# Patient Record
Sex: FEMALE | Race: WHITE | NOT HISPANIC OR LATINO | Employment: FULL TIME | URBAN - METROPOLITAN AREA
[De-identification: names, ages, dates, MRNs, and addresses within clinical notes are randomized per-mention and may not be internally consistent; named-entity substitution may affect disease eponyms.]

---

## 2017-07-20 ENCOUNTER — APPOINTMENT (OUTPATIENT)
Dept: RADIOLOGY | Facility: MEDICAL CENTER | Age: 52
End: 2017-07-20
Attending: OBSTETRICS & GYNECOLOGY
Payer: COMMERCIAL

## 2017-07-31 ENCOUNTER — HOSPITAL ENCOUNTER (OUTPATIENT)
Dept: HOSPITAL 8 - CFH | Age: 52
Discharge: HOME | End: 2017-07-31
Attending: OTOLARYNGOLOGY
Payer: COMMERCIAL

## 2017-07-31 DIAGNOSIS — R13.14: Primary | ICD-10-CM

## 2017-07-31 DIAGNOSIS — Z98.1: ICD-10-CM

## 2017-07-31 PROCEDURE — 70491 CT SOFT TISSUE NECK W/DYE: CPT

## 2020-12-17 ENCOUNTER — OFFICE VISIT (OUTPATIENT)
Dept: URGENT CARE | Facility: PHYSICIAN GROUP | Age: 55
End: 2020-12-17
Payer: OTHER MISCELLANEOUS

## 2020-12-17 ENCOUNTER — HOSPITAL ENCOUNTER (OUTPATIENT)
Facility: MEDICAL CENTER | Age: 55
End: 2020-12-17
Attending: NURSE PRACTITIONER
Payer: COMMERCIAL

## 2020-12-17 VITALS
BODY MASS INDEX: 20.38 KG/M2 | WEIGHT: 115 LBS | TEMPERATURE: 98.1 F | SYSTOLIC BLOOD PRESSURE: 100 MMHG | HEIGHT: 63 IN | OXYGEN SATURATION: 98 % | RESPIRATION RATE: 18 BRPM | DIASTOLIC BLOOD PRESSURE: 62 MMHG | HEART RATE: 82 BPM

## 2020-12-17 DIAGNOSIS — R50.9 FEVER, UNSPECIFIED FEVER CAUSE: ICD-10-CM

## 2020-12-17 DIAGNOSIS — R05.9 COUGH: ICD-10-CM

## 2020-12-17 DIAGNOSIS — Z20.822 EXPOSURE TO COVID-19 VIRUS: ICD-10-CM

## 2020-12-17 LAB — COVID ORDER STATUS COVID19: NORMAL

## 2020-12-17 PROCEDURE — 99204 OFFICE O/P NEW MOD 45 MIN: CPT | Performed by: NURSE PRACTITIONER

## 2020-12-17 PROCEDURE — U0003 INFECTIOUS AGENT DETECTION BY NUCLEIC ACID (DNA OR RNA); SEVERE ACUTE RESPIRATORY SYNDROME CORONAVIRUS 2 (SARS-COV-2) (CORONAVIRUS DISEASE [COVID-19]), AMPLIFIED PROBE TECHNIQUE, MAKING USE OF HIGH THROUGHPUT TECHNOLOGIES AS DESCRIBED BY CMS-2020-01-R: HCPCS

## 2020-12-17 ASSESSMENT — ENCOUNTER SYMPTOMS
SPUTUM PRODUCTION: 0
ABDOMINAL PAIN: 0
SORE THROAT: 0
SHORTNESS OF BREATH: 0
ORTHOPNEA: 0
MYALGIAS: 1
PALPITATIONS: 0
CONSTIPATION: 0
DIARRHEA: 0
DIZZINESS: 0
NAUSEA: 0
SINUS PAIN: 0
COUGH: 1
CHILLS: 0
FEVER: 1
WHEEZING: 0
VOMITING: 0
HEADACHES: 1
WEAKNESS: 0

## 2020-12-17 NOTE — PROGRESS NOTES
Subjective:      Maritza Shaffer is a 55 y.o. female who presents with Cough (cough, fever 100.2 yesterday, chest tightness, body aches. daughter is positive for covid. )            HPI  C/o cough, fever yesterday, body aches, HA x 1 week. Daughter tested positive x 3 days ago for COVID. Taking OTC Ibuprofen. States she is off work until 12/28/20 due to direct exposure to daughter.     PMH:  has a past medical history of Kidney stones and Staphylococcus infection in conditions classified elsewhere and of unspecified site. She also has no past medical history of Breast cancer (HCC).  MEDS:   Current Outpatient Medications:   •  ibuprofen (MOTRIN) 200 MG TABS, Take 200 mg by mouth every 6 hours as needed., Disp: , Rfl:   •  hydrocodone-acetaminophen (NORCO) 5-325 MG TABS per tablet, Take 1-2 Tabs by mouth at bedtime as needed. Do not take at work/driving/operating machinery (Patient not taking: Reported on 12/17/2020), Disp: 30 Tab, Rfl: 0  •  lidocaine (LIDODERM) 5 % PTCH, Apply 1 Patch to skin as directed every 24 hours. (Patient not taking: Reported on 12/17/2020), Disp: 30 Patch, Rfl: 0  •  methylPREDNISolone (MEDROL, ELIZABETH,) 4 MG tablet, As directed (Patient not taking: Reported on 12/17/2020), Disp: 1 Kit, Rfl: 0  •  hydrocodone-acetaminophen (NORCO) 5-325 MG TABS per tablet, Take 1-2 Tabs by mouth at bedtime as needed. (Patient not taking: Reported on 12/17/2020), Disp: 30 Tab, Rfl: 0  •  lidocaine (LIDODERM) 5 % PTCH, Apply 1 Patch to skin as directed every 24 hours. (Patient not taking: Reported on 12/17/2020), Disp: 30 Patch, Rfl: 0  •  cyclobenzaprine (FLEXERIL) 5 MG tablet, Take 1-2 Tabs by mouth at bedtime as needed. (Patient not taking: Reported on 12/17/2020), Disp: 10 Tab, Rfl: 0  •  naproxen (NAPROSYN) 500 MG TABS, Take 1 Tab by mouth 2 times a day, with meals. (Patient not taking: Reported on 12/17/2020), Disp: 60 Tab, Rfl: 3  •  hydrocodone-acetaminophen (NORCO) 7.5-325 MG per tablet, Take 1-2 Tabs  by mouth every 6 hours as needed. (Patient not taking: Reported on 12/17/2020), Disp: 20 Tab, Rfl: 0  •  GuaiFENesin (MUCINEX PO), Take  by mouth., Disp: , Rfl:   •  hydrocod polst-chlorphen polst (TUSSIONEX PENNKINETIC ER) 10-8 MG/5ML LQCR, Take 5 mL by mouth every 12 hours as needed for Cough or Rhinitis. No driving or operation of machinery after taking this medication. Do not exceed 10 mL in a 24-hour period. (Patient not taking: Reported on 12/17/2020), Disp: 75 mL, Rfl: 0  •  VENTOLIN  (90 BASE) MCG/ACT AERS, Inhale 2 Puffs by mouth every 6 hours as needed for Shortness of Breath. Please include metered dose inhaler chamber/spacer device and illustrate useage (Patient not taking: Reported on 12/17/2020), Disp: 1 Inhaler, Rfl: 0  •  Walalqolc-DYE-IS-APAP (TYLENOL COLD HEAD CONGESTION PO), Take  by mouth., Disp: , Rfl:   •  guaifenesin-codeine (ROBITUSSIN AC) SOLN, Take 5 mL by mouth every four hours as needed for Cough. (Patient not taking: Reported on 12/17/2020), Disp: 120 mL, Rfl: 0  •  albuterol (VENTOLIN OR PROVENTIL) 108 (90 BASE) MCG/ACT AERS, Inhale 2 Puffs by mouth every 6 hours as needed for Shortness of Breath. (Patient not taking: Reported on 12/17/2020), Disp: 1 Inhaler, Rfl: 0  •  aspirin (ASA) 81 MG CHEW, Take 81 mg by mouth every day.  , Disp: , Rfl:   ALLERGIES: No Known Allergies  SURGHX:   Past Surgical History:   Procedure Laterality Date   • CERVICAL DISK AND FUSION ANTERIOR  10/31/08    Performed by MANOHAR METCALF at [facility] ORS   • OTHER      tubal   • OTHER ORTHOPEDIC SURGERY      knee     SOCHX:  reports that she has been smoking. She does not have any smokeless tobacco history on file. She reports current alcohol use. She reports that she does not use drugs.  FH: Family history was reviewed, no pertinent findings to report    Review of Systems   Constitutional: Positive for fever and malaise/fatigue. Negative for chills.   HENT: Negative for congestion, ear pain,  "sinus pain and sore throat.    Respiratory: Positive for cough. Negative for sputum production, shortness of breath and wheezing.    Cardiovascular: Negative for chest pain, palpitations and orthopnea.   Gastrointestinal: Negative for abdominal pain, constipation, diarrhea, nausea and vomiting.   Musculoskeletal: Positive for myalgias.   Skin: Negative for itching and rash.   Neurological: Positive for headaches. Negative for dizziness and weakness.   All other systems reviewed and are negative.         Objective:     /62   Pulse 82   Temp 36.7 °C (98.1 °F) (Temporal)   Resp 18   Ht 1.6 m (5' 3\")   Wt 52.2 kg (115 lb)   SpO2 98%   BMI 20.37 kg/m²      Physical Exam  Vitals signs reviewed.   Constitutional:       General: She is awake. She is not in acute distress.     Appearance: Normal appearance. She is well-developed. She is not ill-appearing, toxic-appearing or diaphoretic.   HENT:      Head: Normocephalic.   Eyes:      Conjunctiva/sclera: Conjunctivae normal.      Pupils: Pupils are equal, round, and reactive to light.   Neck:      Musculoskeletal: Normal range of motion and neck supple.   Cardiovascular:      Rate and Rhythm: Normal rate.   Pulmonary:      Effort: Pulmonary effort is normal. No tachypnea, accessory muscle usage or respiratory distress.      Breath sounds: Normal breath sounds and air entry.      Comments: No cough heard on exam.  Musculoskeletal: Normal range of motion.   Skin:     General: Skin is warm and dry.   Neurological:      Mental Status: She is alert and oriented to person, place, and time.      GCS: GCS eye subscore is 4. GCS verbal subscore is 5. GCS motor subscore is 6.   Psychiatric:         Attention and Perception: Attention and perception normal.         Mood and Affect: Mood and affect normal.         Speech: Speech normal.         Behavior: Behavior normal. Behavior is cooperative.               Provider wore N95 and/or surgical mask, eye goggles and/or gloves " with hand sanitization during exam.      Assessment/Plan:        1. Exposure to COVID-19 virus  - COVID/SARS COV-2 PCR; Future    2. Cough    - COVID/SARS COV-2 PCR; Future    3. Fever, unspecified fever cause    - COVID/SARS COV-2 PCR; Future    Increase water intake  May use Tylenol prn for fever or body aches  Get rest  Salt water gargle prn  Flonase, saline nasal spray prn for nasal congestion  May use OTC cough suppressant medications like plain Robitussin/Delsym prn  OTC Immodium prn for any diarrhea with diet modification to clear/bland diet  Monitor for fevers, worse cough, shortness of breath, CP, chest tightness, sinus problems- need re-evaluation  COVID tip sheet given     MyChart release for result, may take up to 72 hrs for result, patient notified

## 2020-12-18 LAB
SARS-COV-2 RNA RESP QL NAA+PROBE: NOTDETECTED
SPECIMEN SOURCE: NORMAL

## 2021-01-05 ENCOUNTER — HOSPITAL ENCOUNTER (OUTPATIENT)
Dept: RADIOLOGY | Facility: MEDICAL CENTER | Age: 56
End: 2021-01-05
Attending: STUDENT IN AN ORGANIZED HEALTH CARE EDUCATION/TRAINING PROGRAM
Payer: COMMERCIAL

## 2021-01-05 DIAGNOSIS — M54.2 NECK PAIN: ICD-10-CM

## 2021-01-05 PROCEDURE — 72050 X-RAY EXAM NECK SPINE 4/5VWS: CPT

## 2021-09-25 ENCOUNTER — HOSPITAL ENCOUNTER (EMERGENCY)
Facility: MEDICAL CENTER | Age: 56
End: 2021-09-25
Attending: EMERGENCY MEDICINE
Payer: COMMERCIAL

## 2021-09-25 ENCOUNTER — APPOINTMENT (OUTPATIENT)
Dept: RADIOLOGY | Facility: MEDICAL CENTER | Age: 56
End: 2021-09-25
Attending: EMERGENCY MEDICINE
Payer: COMMERCIAL

## 2021-09-25 VITALS
OXYGEN SATURATION: 96 % | BODY MASS INDEX: 19.77 KG/M2 | TEMPERATURE: 97.9 F | WEIGHT: 111.55 LBS | HEART RATE: 69 BPM | DIASTOLIC BLOOD PRESSURE: 78 MMHG | SYSTOLIC BLOOD PRESSURE: 101 MMHG | RESPIRATION RATE: 16 BRPM | HEIGHT: 63 IN

## 2021-09-25 DIAGNOSIS — R11.0 NAUSEA: ICD-10-CM

## 2021-09-25 DIAGNOSIS — S00.03XA HEMATOMA OF SCALP, INITIAL ENCOUNTER: ICD-10-CM
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
## 2021-09-25 DIAGNOSIS — S06.0X1A CONCUSSION WITH LOSS OF CONSCIOUSNESS OF 30 MINUTES OR LESS, INITIAL ENCOUNTER: ICD-10-CM
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
## 2021-09-25 LAB
ALBUMIN SERPL BCP-MCNC: 3.8 G/DL (ref 3.2–4.9)
ALBUMIN/GLOB SERPL: 1.5 G/DL
ALP SERPL-CCNC: 64 U/L (ref 30–99)
ALT SERPL-CCNC: 13 U/L (ref 2–50)
ANION GAP SERPL CALC-SCNC: 10 MMOL/L (ref 7–16)
APTT PPP: 28.6 SEC (ref 24.7–36)
AST SERPL-CCNC: 20 U/L (ref 12–45)
BASOPHILS # BLD AUTO: 0.6 % (ref 0–1.8)
BASOPHILS # BLD: 0.06 K/UL (ref 0–0.12)
BILIRUB SERPL-MCNC: 0.4 MG/DL (ref 0.1–1.5)
BUN SERPL-MCNC: 13 MG/DL (ref 8–22)
CALCIUM SERPL-MCNC: 9.2 MG/DL (ref 8.4–10.2)
CHLORIDE SERPL-SCNC: 108 MMOL/L (ref 96–112)
CO2 SERPL-SCNC: 20 MMOL/L (ref 20–33)
CREAT SERPL-MCNC: 0.63 MG/DL (ref 0.5–1.4)
EOSINOPHIL # BLD AUTO: 0.12 K/UL (ref 0–0.51)
EOSINOPHIL NFR BLD: 1.2 % (ref 0–6.9)
ERYTHROCYTE [DISTWIDTH] IN BLOOD BY AUTOMATED COUNT: 46.2 FL (ref 35.9–50)
ETHANOL BLD-MCNC: <10.1 MG/DL (ref 0–10)
GLOBULIN SER CALC-MCNC: 2.5 G/DL (ref 1.9–3.5)
GLUCOSE SERPL-MCNC: 116 MG/DL (ref 65–99)
HCG SERPL QL: NEGATIVE
HCT VFR BLD AUTO: 43 % (ref 37–47)
HGB BLD-MCNC: 14.5 G/DL (ref 12–16)
IMM GRANULOCYTES # BLD AUTO: 0.04 K/UL (ref 0–0.11)
IMM GRANULOCYTES NFR BLD AUTO: 0.4 % (ref 0–0.9)
INR PPP: 1.01 (ref 0.87–1.13)
LYMPHOCYTES # BLD AUTO: 1.18 K/UL (ref 1–4.8)
LYMPHOCYTES NFR BLD: 11.3 % (ref 22–41)
MCH RBC QN AUTO: 31.3 PG (ref 27–33)
MCHC RBC AUTO-ENTMCNC: 33.7 G/DL (ref 33.6–35)
MCV RBC AUTO: 92.7 FL (ref 81.4–97.8)
MONOCYTES # BLD AUTO: 0.48 K/UL (ref 0–0.85)
MONOCYTES NFR BLD AUTO: 4.6 % (ref 0–13.4)
NEUTROPHILS # BLD AUTO: 8.55 K/UL (ref 2–7.15)
NEUTROPHILS NFR BLD: 81.9 % (ref 44–72)
NRBC # BLD AUTO: 0 K/UL
NRBC BLD-RTO: 0 /100 WBC
PLATELET # BLD AUTO: 281 K/UL (ref 164–446)
PMV BLD AUTO: 9.7 FL (ref 9–12.9)
POTASSIUM SERPL-SCNC: 4.4 MMOL/L (ref 3.6–5.5)
PROT SERPL-MCNC: 6.3 G/DL (ref 6–8.2)
PROTHROMBIN TIME: 12.5 SEC (ref 12–14.6)
RBC # BLD AUTO: 4.64 M/UL (ref 4.2–5.4)
SODIUM SERPL-SCNC: 138 MMOL/L (ref 135–145)
WBC # BLD AUTO: 10.4 K/UL (ref 4.8–10.8)

## 2021-09-25 PROCEDURE — 700111 HCHG RX REV CODE 636 W/ 250 OVERRIDE (IP): Performed by: EMERGENCY MEDICINE

## 2021-09-25 PROCEDURE — 96375 TX/PRO/DX INJ NEW DRUG ADDON: CPT

## 2021-09-25 PROCEDURE — 99285 EMERGENCY DEPT VISIT HI MDM: CPT

## 2021-09-25 PROCEDURE — 700102 HCHG RX REV CODE 250 W/ 637 OVERRIDE(OP): Performed by: EMERGENCY MEDICINE

## 2021-09-25 PROCEDURE — 72125 CT NECK SPINE W/O DYE: CPT

## 2021-09-25 PROCEDURE — 82077 ASSAY SPEC XCP UR&BREATH IA: CPT

## 2021-09-25 PROCEDURE — A9270 NON-COVERED ITEM OR SERVICE: HCPCS | Performed by: EMERGENCY MEDICINE

## 2021-09-25 PROCEDURE — 80053 COMPREHEN METABOLIC PANEL: CPT

## 2021-09-25 PROCEDURE — 84703 CHORIONIC GONADOTROPIN ASSAY: CPT

## 2021-09-25 PROCEDURE — 96374 THER/PROPH/DIAG INJ IV PUSH: CPT

## 2021-09-25 PROCEDURE — 85025 COMPLETE CBC W/AUTO DIFF WBC: CPT

## 2021-09-25 PROCEDURE — 70450 CT HEAD/BRAIN W/O DYE: CPT

## 2021-09-25 PROCEDURE — 85730 THROMBOPLASTIN TIME PARTIAL: CPT

## 2021-09-25 PROCEDURE — 85610 PROTHROMBIN TIME: CPT

## 2021-09-25 PROCEDURE — 700105 HCHG RX REV CODE 258: Performed by: EMERGENCY MEDICINE

## 2021-09-25 RX ORDER — DIPHENHYDRAMINE HYDROCHLORIDE 50 MG/ML
25 INJECTION INTRAMUSCULAR; INTRAVENOUS ONCE
Status: COMPLETED | OUTPATIENT
Start: 2021-09-25 | End: 2021-09-25

## 2021-09-25 RX ORDER — ACETAMINOPHEN 500 MG
1000 TABLET ORAL ONCE
Status: COMPLETED | OUTPATIENT
Start: 2021-09-25 | End: 2021-09-25

## 2021-09-25 RX ORDER — KETOROLAC TROMETHAMINE 30 MG/ML
15 INJECTION, SOLUTION INTRAMUSCULAR; INTRAVENOUS ONCE
Status: COMPLETED | OUTPATIENT
Start: 2021-09-25 | End: 2021-09-25

## 2021-09-25 RX ORDER — METHOCARBAMOL 750 MG/1
750 TABLET, FILM COATED ORAL 3 TIMES DAILY
Qty: 30 TABLET | Refills: 0 | Status: SHIPPED | OUTPATIENT
Start: 2021-09-25 | End: 2021-10-05

## 2021-09-25 RX ORDER — ONDANSETRON 2 MG/ML
4 INJECTION INTRAMUSCULAR; INTRAVENOUS ONCE
Status: COMPLETED | OUTPATIENT
Start: 2021-09-25 | End: 2021-09-25

## 2021-09-25 RX ORDER — PROCHLORPERAZINE MALEATE 10 MG
10 TABLET ORAL ONCE
Status: COMPLETED | OUTPATIENT
Start: 2021-09-25 | End: 2021-09-25

## 2021-09-25 RX ORDER — SODIUM CHLORIDE 9 MG/ML
1000 INJECTION, SOLUTION INTRAVENOUS ONCE
Status: COMPLETED | OUTPATIENT
Start: 2021-09-25 | End: 2021-09-25

## 2021-09-25 RX ORDER — ONDANSETRON 4 MG/1
4 TABLET, ORALLY DISINTEGRATING ORAL EVERY 6 HOURS PRN
Qty: 10 TABLET | Refills: 0 | Status: SHIPPED | OUTPATIENT
Start: 2021-09-25 | End: 2021-10-01 | Stop reason: SDUPTHER

## 2021-09-25 RX ORDER — ACETAMINOPHEN 325 MG/1
650 TABLET ORAL EVERY 6 HOURS PRN
Qty: 30 TABLET | Refills: 0 | Status: SHIPPED | OUTPATIENT
Start: 2021-09-25 | End: 2021-10-22

## 2021-09-25 RX ADMIN — ACETAMINOPHEN 1000 MG: 500 TABLET, FILM COATED ORAL at 03:33

## 2021-09-25 RX ADMIN — FENTANYL CITRATE 50 MCG: 50 INJECTION, SOLUTION INTRAMUSCULAR; INTRAVENOUS at 03:05

## 2021-09-25 RX ADMIN — KETOROLAC TROMETHAMINE 15 MG: 30 INJECTION, SOLUTION INTRAMUSCULAR at 03:34

## 2021-09-25 RX ADMIN — SODIUM CHLORIDE 1000 ML: 9 INJECTION, SOLUTION INTRAVENOUS at 02:03

## 2021-09-25 RX ADMIN — PROCHLORPERAZINE MALEATE 10 MG: 10 TABLET ORAL at 03:33

## 2021-09-25 RX ADMIN — DIPHENHYDRAMINE HYDROCHLORIDE 25 MG: 50 INJECTION INTRAMUSCULAR; INTRAVENOUS at 03:34

## 2021-09-25 RX ADMIN — ONDANSETRON 4 MG: 2 INJECTION INTRAMUSCULAR; INTRAVENOUS at 02:03

## 2021-09-25 ASSESSMENT — PAIN DESCRIPTION - PAIN TYPE: TYPE: ACUTE PAIN

## 2021-09-25 NOTE — ED PROVIDER NOTES
ED Provider Note    CHIEF COMPLAINT  Fall, HA, dizziness    HPI  Patient is a 55-year-old female with no reported past medical history beyond some prior neck pain and prior surgery who presents emergency room for evaluation of headache and neck pain.  She states that she was at home earlier tonight, got up to go the bathroom and believes that she fell, she was very dazed and woke up in her bed and was having ongoing headache, pain in the back left portion of her head, some associated neck pain and stiffness and dizziness.  She called her daughter who came to the bedside and said that she was having substantial amounts of nausea, headaches and they brought her here for further evaluation.  Pain is severe, focused in the left posterior aspect of the head with mild to moderate pain in the upper portion of the neck, she has no numbness or tingling in her upper extremities, no chest pain or palpitations and has no recollection of any exertional chest discomfort or dizziness within the last week.  While lying in the bed she is not having any dizziness or double vision but has some of the symptoms exacerbated by rapid head movements.    PPE Note: I personally donned full PPE for all patient encounters during this visit, including being clean-shaven with an N95 respirator mask, gloves, and goggles.    REVIEW OF SYSTEMS  Constitutional: No recent illness.  Skin: posterior head pain  Eyes: No change in vision.  HENT: + scalp hematoma. No change in hearing. No epistaxis. No loose teeth.  Resp: No shortness of breath or difficulty breathing.  Cardiac: No chest pain.  GI: No abdominal pain. No vomiting.  : no dysuria  Musc: No swollen joints or extremity pain.  Neuro: + HA. + LOC. No paresthesias.  Endocrine: No history of diabetes.  Heme: No known bleeding disorder or anemia.  Psych: No depression.    PAST MEDICAL HISTORY   has a past medical history of Kidney stones and Staphylococcus infection in conditions classified  "elsewhere and of unspecified site.    SOCIAL HISTORY  Social History     Tobacco Use   • Smoking status: Current Every Day Smoker   • Smokeless tobacco: Never Used   • Tobacco comment: 1 pack per day   Substance and Sexual Activity   • Alcohol use: Yes     Comment: occ   • Drug use: No   • Sexual activity: Not on file     SURGICAL HISTORY   has a past surgical history that includes other orthopedic surgery; other; and cervical disk and fusion anterior (10/31/08).    CURRENT MEDICATIONS  Home Medications    **Home medications have not yet been reviewed for this encounter**       ALLERGIES  No Known Allergies    PHYSICAL EXAM  VITAL SIGNS: /78   Pulse 69   Temp 36.6 °C (97.9 °F) (Temporal)   Resp 16   Ht 1.6 m (5' 3\")   Wt 50.6 kg (111 lb 8.8 oz)   SpO2 96%   BMI 19.76 kg/m²   Pulse ox interpretation: I interpret this pulse ox as normal.  General: Alert, Oriented x3. Mild distress. Non-toxic appearing.   Head: Normocephalic, left posterior occiput hematoma, no open lacerations.   Eyes: Pupils: R: 3 mm, L:3 mm. EOMI. Sclerae/Conjunctivae normal in appearance. No Raccoon Eyes.   Nose: No septal hematomas.   Ears: No hemotymapnum, no Jj Sign.   Mouth: No midface instability. No malocclusion.   Neck: non-specific upper cervical tenderness, no step-off, or hematoma.   Back: No TTP. No step-off, or hematoma.   Chest: No retractions. Chest wall is non-tender   Lungs: Clear and equal to auscultation bilaterally. No wheezes, rales, or rhonchi. No respiratory distress.   Cardiovascular: Regular Rate and Rhythm. Normal S1 and S2.   Abdomen: Soft, non-distended, non-tender. No rebound or guarding.   Pelvis: Stable   Musculoskeletal: Full active and passive ROM of bilateral shoulders, elbows, wrists, hips, knees, and ankles without pain or tenderness.   Neuro: A&O x4. Motor: 5/5 to flexion/extension of all 4 extremities. Sensory intact in all 4 extremities.   Skin: posterior scalp changes as above    DIAGNOSTIC " STUDIES / PROCEDURES    LABS  Labs Reviewed   CBC WITH DIFFERENTIAL - Abnormal; Notable for the following components:       Result Value    Neutrophils-Polys 81.90 (*)     Lymphocytes 11.30 (*)     Neutrophils (Absolute) 8.55 (*)     All other components within normal limits    Narrative:     Indicate which anticoagulants the patient is on:->UNKNOWN   COMP METABOLIC PANEL - Abnormal; Notable for the following components:    Glucose 116 (*)     All other components within normal limits    Narrative:     Indicate which anticoagulants the patient is on:->UNKNOWN   PROTHROMBIN TIME    Narrative:     Indicate which anticoagulants the patient is on:->UNKNOWN   APTT    Narrative:     Indicate which anticoagulants the patient is on:->UNKNOWN   HCG QUAL SERUM   ESTIMATED GFR    Narrative:     Indicate which anticoagulants the patient is on:->UNKNOWN   ETHYL ALCOHOL (BLOOD)   DIAGNOSTIC ALCOHOL     RADIOLOGY  CT-CSPINE WITHOUT PLUS RECONS   Final Result      CT of the cervical spine without contrast within normal limits.      CT-HEAD W/O   Final Result      There is a soft tissue hematoma along the posterior left parieto-occipital region. No underlying calvarial fracture. No intracranial hemorrhage or mass.        COURSE & MEDICAL DECISION MAKING  Pertinent Labs & Imaging studies reviewed. (See chart for details)    DDX:  Closed head injury  ICH/SDH  Cervical strain  Spine Fracture/Dislocation or Spinal Cord Injury  Extremity Contusion/Abrasion/Fracture/Dislocation  Concussion    MDM    Initial evaluation at 0118:  Patient presents emergency room for symptoms as described above.  The patient has no focal neurological deficits, has a posterior hematoma that is with no evidence of open laceration or active bleeding.  At this time she does not demonstrate increased intracranial pressure but the mechanism is concerning and the fact that this was unwitnessed and she has continued headaches I have ordered CT scan of the head and  neck and she has a lot of nonspecific neck tenderness.  Lab work was also obtained for broad differential as noted above.  She has no focal intracranial bleeds, no skull fractures, no acute vertebral abnormalities and following confirmation of no intracranial bleed symptomatic headache medications were administered.  Following Toradol, Tylenol, Benadryl and IV fluids patient was reassessed on several occasions and shows no interval worsening and overall is improved.  She does not have a focal deficit at this time I would advise her to maintain adequate hydration, have outpatient follow-up in the next 6 to 8 weeks for maintenance regarding postconcussive syndrome.  Additionally Tylenol and ibuprofen as needed going forward and if she has any interval worsening sore weakness or other acute complaints she would need to be reevaluated more promptly in the emergency department.  Questions are addressed with both the patient and daughter and they are discharged home in stable condition.    FINAL IMPRESSION  Visit Diagnoses     ICD-10-CM   1. Fall in home, initial encounter  W19.XXXA    Y92.009   2. Concussion with loss of consciousness of 30 minutes or less, initial encounter  S06.0X1A   3. Nausea  R11.0   4. Hematoma of scalp, initial encounter  S00.03XA     Electronically signed by: Casa Vera M.D., 9/25/2021 1:18 AM

## 2021-09-25 NOTE — ED NOTES
Pt discharged in stable condition. Pt instructed to follow up with PCP, return to the ER if symptoms worsen. PIV removed, tip intact.

## 2021-09-25 NOTE — ED TRIAGE NOTES
Patient states that she woke up to go to the bathroom one hour ago and fell and hit the back of her head. She says she may have passed out but is not sure. Complains of pain in the back of her head with swelling as well as nausea. Patient is AnOx4.

## 2021-09-25 NOTE — ED NOTES
Pt wheeled back to room, difficulty with standing and unsteady gait. States she feels very dizzy. Complains of some blurred vision since she hit her head. Denies injury or trauma to other parts of the body other than the back of her head. No vomiting.

## 2021-09-27 ENCOUNTER — PATIENT OUTREACH (OUTPATIENT)
Dept: HEALTH INFORMATION MANAGEMENT | Facility: OTHER | Age: 56
End: 2021-09-27

## 2021-09-27 NOTE — PROGRESS NOTES
9/27/2021  CHW received incoming call from [facility]  Adali. CHW called patient to follow up after ED visit. Patient needs PCP. Patient was scheduled at [facility] on 9.28.2021 at 12:30pm with Loren Dunlap. CHW informed patient of date, time, and location of appointment. Patient will have transportation to appointment. CHW will no longer follow as patient has no other needs.

## 2021-09-27 NOTE — DISCHARGE PLANNING
Anticipated Discharge Disposition: Home    Action: Scheduled with  Loren Dunlap at  [facility] for PCP  in am per CHW. Can follow there for work status. She will have concerns addressed. ER CM spoke with pt directly she is agreeable to this plan.      Barriers to Discharge: None    Plan: No further needs

## 2021-09-27 NOTE — DISCHARGE PLANNING
Anticipated Discharge Disposition: Home    Action: Call from pt. She took herself off work and FMLA paperwork. Called to see if Dr Vera would complete it. Advised needs to follow with pcp. She does not have a pcp. Will need PCP regarding FMLA and off work status. ER CM does not see that Dr Vera took her off work . She is agreeable to CHW to help expedite PCP appt.     Barriers to Discharge: None    Plan: Pt will address concerns with PCP for work status and needs

## 2021-09-27 NOTE — DISCHARGE PLANNING
Anticipated Discharge Disposition: Home    Action: Call from patient dtr to VM. Frustrated. Cont to want work note for 1 week. Reports Dr Vera offered. Request call back. Dtr called ER as well and spoke with Staff. Encouraged if mother still not feeling well to return for recheck. ER CM agrees with this.     Barriers to Discharge: None.     Plan: Will follow with PCP as assisted by CHW for ongoing work status and FMLA paperwork. Will return to ER for worsening of symptoms and recheck

## 2021-09-28 ENCOUNTER — HOSPITAL ENCOUNTER (OUTPATIENT)
Facility: MEDICAL CENTER | Age: 56
End: 2021-09-28
Attending: STUDENT IN AN ORGANIZED HEALTH CARE EDUCATION/TRAINING PROGRAM
Payer: COMMERCIAL

## 2021-09-28 ENCOUNTER — HOSPITAL ENCOUNTER (OUTPATIENT)
Dept: RADIOLOGY | Facility: MEDICAL CENTER | Age: 56
End: 2021-09-28
Attending: STUDENT IN AN ORGANIZED HEALTH CARE EDUCATION/TRAINING PROGRAM
Payer: COMMERCIAL

## 2021-09-28 ENCOUNTER — OFFICE VISIT (OUTPATIENT)
Dept: MEDICAL GROUP | Facility: PHYSICIAN GROUP | Age: 56
End: 2021-09-28
Payer: OTHER MISCELLANEOUS

## 2021-09-28 VITALS
BODY MASS INDEX: 19.76 KG/M2 | HEIGHT: 63 IN | HEART RATE: 92 BPM | SYSTOLIC BLOOD PRESSURE: 86 MMHG | DIASTOLIC BLOOD PRESSURE: 70 MMHG | TEMPERATURE: 98.7 F | OXYGEN SATURATION: 96 %

## 2021-09-28 DIAGNOSIS — G44.319 ACUTE POST-TRAUMATIC HEADACHE, NOT INTRACTABLE: ICD-10-CM

## 2021-09-28 DIAGNOSIS — E83.52 HYPERCALCEMIA: ICD-10-CM

## 2021-09-28 DIAGNOSIS — R11.2 NON-INTRACTABLE VOMITING WITH NAUSEA, UNSPECIFIED VOMITING TYPE: ICD-10-CM

## 2021-09-28 DIAGNOSIS — R10.13 EPIGASTRIC PAIN: ICD-10-CM

## 2021-09-28 DIAGNOSIS — R19.7 DIARRHEA, UNSPECIFIED TYPE: ICD-10-CM

## 2021-09-28 DIAGNOSIS — R68.83 CHILLS (WITHOUT FEVER): ICD-10-CM

## 2021-09-28 DIAGNOSIS — I95.9 HYPOTENSION, UNSPECIFIED HYPOTENSION TYPE: ICD-10-CM

## 2021-09-28 DIAGNOSIS — M54.2 NECK PAIN, CHRONIC: ICD-10-CM

## 2021-09-28 DIAGNOSIS — G89.29 NECK PAIN, CHRONIC: ICD-10-CM

## 2021-09-28 LAB
ALBUMIN SERPL BCP-MCNC: 4.8 G/DL (ref 3.2–4.9)
ALBUMIN/GLOB SERPL: 1.5 G/DL
ALP SERPL-CCNC: 70 U/L (ref 30–99)
ALT SERPL-CCNC: 12 U/L (ref 2–50)
ANION GAP SERPL CALC-SCNC: 14 MMOL/L (ref 7–16)
AST SERPL-CCNC: 14 U/L (ref 12–45)
BASOPHILS # BLD AUTO: 0.9 % (ref 0–1.8)
BASOPHILS # BLD: 0.07 K/UL (ref 0–0.12)
BILIRUB SERPL-MCNC: 0.7 MG/DL (ref 0.1–1.5)
BUN SERPL-MCNC: 13 MG/DL (ref 8–22)
CALCIUM SERPL-MCNC: 11 MG/DL (ref 8.5–10.5)
CHLORIDE SERPL-SCNC: 102 MMOL/L (ref 96–112)
CHOLEST SERPL-MCNC: 203 MG/DL (ref 100–199)
CO2 SERPL-SCNC: 24 MMOL/L (ref 20–33)
CREAT SERPL-MCNC: 0.72 MG/DL (ref 0.5–1.4)
EOSINOPHIL # BLD AUTO: 0.11 K/UL (ref 0–0.51)
EOSINOPHIL NFR BLD: 1.3 % (ref 0–6.9)
ERYTHROCYTE [DISTWIDTH] IN BLOOD BY AUTOMATED COUNT: 45.1 FL (ref 35.9–50)
FLUAV+FLUBV AG SPEC QL IA: NEGATIVE
GLOBULIN SER CALC-MCNC: 3.1 G/DL (ref 1.9–3.5)
GLUCOSE SERPL-MCNC: 99 MG/DL (ref 65–99)
HCT VFR BLD AUTO: 46.7 % (ref 37–47)
HDLC SERPL-MCNC: 45 MG/DL
HGB BLD-MCNC: 16.2 G/DL (ref 12–16)
IMM GRANULOCYTES # BLD AUTO: 0.02 K/UL (ref 0–0.11)
IMM GRANULOCYTES NFR BLD AUTO: 0.2 % (ref 0–0.9)
INT CON NEG: NEGATIVE
INT CON POS: POSITIVE
LDLC SERPL CALC-MCNC: 126 MG/DL
LIPASE SERPL-CCNC: 26 U/L (ref 11–82)
LYMPHOCYTES # BLD AUTO: 2.22 K/UL (ref 1–4.8)
LYMPHOCYTES NFR BLD: 27 % (ref 22–41)
MCH RBC QN AUTO: 31.6 PG (ref 27–33)
MCHC RBC AUTO-ENTMCNC: 34.7 G/DL (ref 33.6–35)
MCV RBC AUTO: 91.2 FL (ref 81.4–97.8)
MONOCYTES # BLD AUTO: 0.54 K/UL (ref 0–0.85)
MONOCYTES NFR BLD AUTO: 6.6 % (ref 0–13.4)
NEUTROPHILS # BLD AUTO: 5.25 K/UL (ref 2–7.15)
NEUTROPHILS NFR BLD: 64 % (ref 44–72)
NRBC # BLD AUTO: 0 K/UL
NRBC BLD-RTO: 0 /100 WBC
PLATELET # BLD AUTO: 318 K/UL (ref 164–446)
PMV BLD AUTO: 10.6 FL (ref 9–12.9)
POTASSIUM SERPL-SCNC: 4 MMOL/L (ref 3.6–5.5)
PROT SERPL-MCNC: 7.9 G/DL (ref 6–8.2)
RBC # BLD AUTO: 5.12 M/UL (ref 4.2–5.4)
SODIUM SERPL-SCNC: 140 MMOL/L (ref 135–145)
TRIGL SERPL-MCNC: 162 MG/DL (ref 0–149)
WBC # BLD AUTO: 8.2 K/UL (ref 4.8–10.8)

## 2021-09-28 PROCEDURE — 700117 HCHG RX CONTRAST REV CODE 255: Performed by: STUDENT IN AN ORGANIZED HEALTH CARE EDUCATION/TRAINING PROGRAM

## 2021-09-28 PROCEDURE — 83690 ASSAY OF LIPASE: CPT

## 2021-09-28 PROCEDURE — 99215 OFFICE O/P EST HI 40 MIN: CPT | Performed by: STUDENT IN AN ORGANIZED HEALTH CARE EDUCATION/TRAINING PROGRAM

## 2021-09-28 PROCEDURE — U0005 INFEC AGEN DETEC AMPLI PROBE: HCPCS

## 2021-09-28 PROCEDURE — 80053 COMPREHEN METABOLIC PANEL: CPT

## 2021-09-28 PROCEDURE — 74177 CT ABD & PELVIS W/CONTRAST: CPT

## 2021-09-28 PROCEDURE — 85025 COMPLETE CBC W/AUTO DIFF WBC: CPT

## 2021-09-28 PROCEDURE — 87804 INFLUENZA ASSAY W/OPTIC: CPT | Performed by: STUDENT IN AN ORGANIZED HEALTH CARE EDUCATION/TRAINING PROGRAM

## 2021-09-28 PROCEDURE — 80061 LIPID PANEL: CPT

## 2021-09-28 PROCEDURE — U0003 INFECTIOUS AGENT DETECTION BY NUCLEIC ACID (DNA OR RNA); SEVERE ACUTE RESPIRATORY SYNDROME CORONAVIRUS 2 (SARS-COV-2) (CORONAVIRUS DISEASE [COVID-19]), AMPLIFIED PROBE TECHNIQUE, MAKING USE OF HIGH THROUGHPUT TECHNOLOGIES AS DESCRIBED BY CMS-2020-01-R: HCPCS

## 2021-09-28 RX ADMIN — IOHEXOL 100 ML: 350 INJECTION, SOLUTION INTRAVENOUS at 15:55

## 2021-09-28 NOTE — PATIENT INSTRUCTIONS
Viral Gastroenteritis, Adult    Viral gastroenteritis is also known as the stomach flu. This condition may affect your stomach, small intestine, and large intestine. It can cause sudden watery diarrhea, fever, and vomiting. This condition is caused by many different viruses. These viruses can be passed from person to person very easily (are contagious).  Diarrhea and vomiting can make you feel weak and cause you to become dehydrated. You may not be able to keep fluids down. Dehydration can make you tired and thirsty, cause you to have a dry mouth, and decrease how often you urinate. It is important to replace the fluids that you lose from diarrhea and vomiting.  What are the causes?  Gastroenteritis is caused by many viruses, including rotavirus and norovirus. Norovirus is the most common cause in adults. You can get sick after being exposed to the viruses from other people. You can also get sick by:  · Eating food, drinking water, or touching a surface contaminated with one of these viruses.  · Sharing utensils or other personal items with an infected person.  What increases the risk?  You are more likely to develop this condition if you:  · Have a weak body defense system (immune system).  · Live with one or more children who are younger than 2 years old.  · Live in a nursing home.  · Travel on cruise ships.  What are the signs or symptoms?  Symptoms of this condition start suddenly 1-3 days after exposure to a virus. Symptoms may last for a few days or for as long as a week. Common symptoms include watery diarrhea and vomiting. Other symptoms include:  · Fever.  · Headache.  · Fatigue.  · Pain in the abdomen.  · Chills.  · Weakness.  · Nausea.  · Muscle aches.  · Loss of appetite.  How is this diagnosed?  This condition is diagnosed with a medical history and physical exam. You may also have a stool test to check for viruses or other infections.  How is this treated?  This condition typically goes away on its  own. The focus of treatment is to prevent dehydration and restore lost fluids (rehydration). This condition may be treated with:  · An oral rehydration solution (ORS) to replace important salts and minerals (electrolytes) in your body. Take this if told by your health care provider. This is a drink that is sold at pharmacies and retail stores.  · Medicines to help with your symptoms.  · Probiotic supplements to reduce symptoms of diarrhea.  · Fluids given through an IV, if dehydration is severe.  Older adults and people with other diseases or a weak immune system are at higher risk for dehydration.  Follow these instructions at home:    Eating and drinking    · Take an ORS as told by your health care provider.  · Drink clear fluids in small amounts as you are able. Clear fluids include:  ? Water.  ? Ice chips.  ? Diluted fruit juice.  ? Low-calorie sports drinks.  · Drink enough fluid to keep your urine pale yellow.  · Eat small amounts of healthy foods every 3-4 hours as you are able. This may include whole grains, fruits, vegetables, lean meats, and yogurt.  · Avoid fluids that contain a lot of sugar or caffeine, such as energy drinks, sports drinks, and soda.  · Avoid spicy or fatty foods.  · Avoid alcohol.  General instructions  · Wash your hands often, especially after having diarrhea or vomiting. If soap and water are not available, use hand .  · Make sure that all people in your household wash their hands well and often.  · Take over-the-counter and prescription medicines only as told by your health care provider.  · Rest at home while you recover.  · Watch your condition for any changes.  · Take a warm bath to relieve any burning or pain from frequent diarrhea episodes.  · Keep all follow-up visits as told by your health care provider. This is important.  Contact a health care provider if you:  · Cannot keep fluids down.  · Have symptoms that get worse.  · Have new symptoms.  · Feel light-headed or  dizzy.  · Have muscle cramps.  Get help right away if you:  · Have chest pain.  · Feel extremely weak or you faint.  · See blood in your vomit.  · Have vomit that looks like coffee grounds.  · Have bloody or black stools or stools that look like tar.  · Have a severe headache, a stiff neck, or both.  · Have a rash.  · Have severe pain, cramping, or bloating in your abdomen.  · Have trouble breathing or you are breathing very quickly.  · Have a fast heartbeat.  · Have skin that feels cold and clammy.  · Feel confused.  · Have pain when you urinate.  · Have signs of dehydration, such as:  ? Dark urine, very little urine, or no urine.  ? Cracked lips.  ? Dry mouth.  ? Sunken eyes.  ? Sleepiness.  ? Weakness.  Summary  · Viral gastroenteritis is also known as the stomach flu. It can cause sudden watery diarrhea, fever, and vomiting.  · This condition can be passed from person to person very easily (is contagious).  · Take an ORS if told by your health care provider. This is a drink that is sold at pharmacies and retail stores.  · Wash your hands often, especially after having diarrhea or vomiting. If soap and water are not available, use hand .  This information is not intended to replace advice given to you by your health care provider. Make sure you discuss any questions you have with your health care provider.  Document Released: 12/18/2006 Document Revised: 10/23/2019 Document Reviewed: 10/23/2019  [employer] Patient Education © 2020 [employer]      Head Injury, Adult  There are many types of head injuries. They can be as minor as a bump. Some head injuries can be worse. Worse injuries include:  · A strong hit to the head that shakes the brain back and forth causing damage (concussion).  · A bruise (contusion) of the brain. This means there is bleeding in the brain that can cause swelling.  · A cracked skull (skull fracture).  · Bleeding in the brain that gathers, gets thick (makes a clot), and forms a  bump (hematoma).  Most problems from a head injury come in the first 24 hours. However, you may still have side effects up to 7-10 days after your injury. It is important to watch your condition for any changes. You may need to be watched in the emergency department or urgent care, or you may need to stay in the hospital.  What are the causes?  There are many possible causes of a head injury. A serious head injury may be caused by:  · A car accident.  · Bicycle or motorcycle accidents.  · Sports injuries.  · Falls.  What are the signs or symptoms?  Symptoms of a head injury include a bruise, bump, or bleeding where the injury happened. Other physical symptoms may include:  · Headache.  · Feeling sick to your stomach (nauseous) or vomiting.  · Dizziness.  · Feeling tired.  · Being uncomfortable around bright lights or loud noises.  · Shaking movements that you cannot control (seizures).  · Trouble being woken up.  · Passing out (fainting).  Mental or emotional symptoms may include:  · Feeling grumpy or cranky.  · Confusion and memory problems.  · Having trouble paying attention or concentrating.  · Changes in eating or sleeping habits.  · Feeling worried or nervous (anxious).  · Feeling sad (depressed).  How is this treated?  Treatment for this condition depends on how severe the injury is and the type of injury you have. The main goal is to prevent complications and to allow the brain time to heal.  Mild head injury  If you have a mild head injury, you may be sent home and treatment may include:  · Being watched. A responsible adult should stay with you for 24 hours after your injury and check on you often.  · Physical rest.  · Brain rest.  · Pain medicines.  Severe head injury  If you have a severe head injury, treatment may include:  · Being watched closely. This includes hospitalization with frequent physical exams.  · Medicines to:  ? Help with pain.  ? Prevent shaking movements that you cannot control.  ? Help  with brain swelling.  · Using a machine that helps you breathe (ventilator).  · Treatments to manage the swelling inside the brain.  · Brain surgery. This may be needed to:  ? Remove a blood clot.  ? Stop the bleeding.  ? Remove a part of the skull. This allows room for the brain to swell.  Follow these instructions at home:  Activity  · Rest.  · Avoid activities that are hard or tiring.  · Make sure you get enough sleep.  · Limit activities that need a lot of thought or attention, such as:  ? Watching TV.  ? Playing memory games and puzzles.  ? Job-related work or homework.  ? Working on the computer, social media, and texting.  · Avoid activities that could cause another head injury until your doctor says it is okay. This includes playing sports. Having another head injury, especially before the first one has healed, can be dangerous.  · Ask your doctor when it is safe for you to go back to your normal activities, such as work or school. Ask your doctor for a step-by-step plan for slowly going back to your normal activities.  · Ask your doctor when you can drive, ride a bicycle, or use heavy machinery. Do not do these activities if you are dizzy.  Lifestyle    · Do not drink alcohol until your doctor says it is okay.  · Do not use drugs.  · If it is harder than usual to remember things, write them down.  · If you are easily distracted, try to do one thing at a time.  · Talk with family members or close friends when making important decisions.  · Tell your friends, family, a trusted coworker, and  about your injury, symptoms, and limits (restrictions). Have them watch for any problems that are new or getting worse.  General instructions  · Take over-the-counter and prescription medicines only as told by your doctor.  · Have someone stay with you for 24 hours after your head injury. This person should watch you for any changes in your symptoms and be ready to get help.  · Keep all follow-up visits as  told by your doctor. This is important.  How is this prevented?  · Work on your balance and strength. This can help you avoid falls.  · Wear a seatbelt when you are in a moving vehicle.  · Wear a helmet when you:  ? Ride a bicycle.  ? Ski.  ? Do any other sport or activity that has a risk of injury.  · If you drink alcohol:  ? Limit how much you use to:  ? 0-1 drink a day for women.  ? 0-2 drinks a day for men.  ? Be aware of how much alcohol is in your drink. In the [place] one drink equals one 12 oz bottle of beer (355 mL), one 5 oz glass of wine (148 mL), or one 1½ oz glass of hard liquor (44 mL).  · Make your home safer by:  ? Getting rid of clutter from the floors and stairs. This includes things that can make you trip.  ? Using grab bars in bathrooms and handrails by stairs.  ? Placing non-slip mats on floors and in bathtubs.  ? Putting more light in dim areas.  Get help right away if:  · You have:  ? A very bad headache that is not helped by medicine.  ? Trouble walking or weakness in your arms and legs.  ? Clear or bloody fluid coming from your nose or ears.  ? Changes in how you see (vision).  ? Shaking movements that you cannot control.  · You lose your balance.  · You vomit.  · The black centers of your eyes (pupils) change in size.  · Your speech is slurred.  · Your dizziness gets worse.  · You pass out.  · You are sleepier than normal and have trouble staying awake.  · Your symptoms get worse.  These symptoms may be an emergency. Do not wait to see if the symptoms will go away. Get medical help right away. Call your local emergency services (911 in the [place] Do not drive yourself to the hospital.  Summary  · There are many types of head injuries. They can be as minor as a bump. Some head injuries can be worse  · Treatment for this condition depends on how severe the injury is and the type of injury you have.  · Ask your doctor when it is safe for you to go back to your normal activities, such as work or  school.  · To prevent a head injury, wear a seat belt in a car, wear a helmet when you use a a bicycle, limit your alcohol use, and make your home safer.  This information is not intended to replace advice given to you by your health care provider. Make sure you discuss any questions you have with your health care provider.  Document Released: 11/30/2009 Document Revised: 04/09/2020 Document Reviewed: 01/10/2020  [employer] Patient Education © 2020 [employer]

## 2021-09-28 NOTE — PROGRESS NOTES
Subjective:     CC:  Establish care, chills/vomiting/diarrhea    HISTORY OF THE PRESENT ILLNESS: Patient is a 55 y.o. female. This pleasant patient is here today to establish care and discuss chills/vomiting/diarrhea.    Patient recently went to the emergency department 9/25 for syncope and fall at home 9/24 when getting up to go to the bathroom (didn't remember getting from the bathroom back to her bed) which was preceded by neck pain/stiffness (acute on chronic), headache and dizziness.    Since then she has been nauseated with vomiting (but not in around 2 days), but now has pain and cramping epigastric/RUQ (started the day after the ED visit) that is worse with eating. Diarrhea started the day after she got home, very loose and no blood. Reports chills, but denies congestion, rhinorrhea, fever, SOB, chest pains. Has taken 2 tums today which did help her pain somewhat. Not vaccinated to COVID.    Patient still feels dizzy and disoriented (mostly with movement room spinning). Does have a headache now, but does improve with tylenol. Zofran helps.  Was given methocarbamol for her neck pain which she took once or twice but not recently.    She request completion of leave of absence paperwork since she hasn't been able to work due to symptoms (has no paperwork today).     Current Outpatient Medications Ordered in Epic   Medication Sig Dispense Refill   • methocarbamol (ROBAXIN) 750 MG Tab Take 1 Tablet by mouth 3 times a day for 10 days. 30 Tablet 0   • acetaminophen (TYLENOL) 325 MG Tab Take 2 Tablets by mouth every 6 hours as needed for Moderate Pain or Fever (headache). 30 Tablet 0   • ondansetron (ZOFRAN ODT) 4 MG TABLET DISPERSIBLE Take 1 Tablet by mouth every 6 hours as needed. 10 Tablet 0     No current Epic-ordered facility-administered medications on file.     ROS:   See HPI      Objective:     Exam: BP (!) 86/70 (BP Location: Left arm, Patient Position: Sitting, BP Cuff Size: Small adult)   Pulse 92   Temp  "37.1 °C (98.7 °F) (Temporal)   Ht 1.6 m (5' 3\")   SpO2 96%  Body mass index is 19.76 kg/m².    General: Well developed, well nourished in no acute distress.  Appears fatigued.  Head: Normocephalic and atraumatic.  Eyes: Conjunctivae and extraocular motions are normal. Pupils are equal, round, and reactive to light. No scleral icterus.   Ears:  External ears unremarkable. Tympanic membranes clear and intact.  Nose: Nares patent. No discharge.  Mouth/Throat: Oropharynx is clear and moist. Posterior pharynx without erythema or exudates.  Neck: Supple. Thyroid is not enlarged.  Pulmonary: Clear to ausculation.  Normal effort. No rales, ronchi, or wheezing.  Cardiovascular: Regular rate and rhythm without murmur.  Abdomen: Soft, nondistended.  Exquisite epigastric and right upper quadrant tenderness even with light palpation.  Normal bowel sounds. No HSM.  Neurologic: Cranial nerves II through XII intact. Normal gait.  Negative Kernig's/Babinski's.  Lymph: No cervical or supraclavicular lymph nodes are palpable  Skin: Warm and dry.  No obvious lesions.  Musculoskeletal: No extremity cyanosis, clubbing, or edema.  Psych: Normal mood and affect. Alert and oriented x3. Judgment and insight is normal.    Labs: Reviewed from 9/25/2021, 9/28/2021  Imaging: Reviewed from 9/25/2021  IMPRESSION:  CT of the cervical spine without contrast within normal limits.  IMPRESSION:  There is a soft tissue hematoma along the posterior left parieto-occipital region. No underlying calvarial fracture. No intracranial hemorrhage or mass.  Reviewed from 9/28/2021  IMPRESSION:  1.  Sigmoid diverticulosis without convincing evidence of acute diverticulitis  2.  Arteriovenous shunting in the left hemipelvis drains into the left gonadal vein and likely hypogastric as well. This likely indicates some degree of a left to right shunt.  3.  Cholecystectomy without biliary dilatation  4.  Mild atherosclerosis    Assessment & Plan:   55 y.o. female with " the following -    1. Epigastric pain  - LIPASE; Future  - Comp Metabolic Panel; Future  - CBC WITH DIFFERENTIAL; Future  - CT-ABDOMEN-PELVIS WITH; Future  - Lipid Profile; Future  2. Chills (without fever)  - POCT Influenza A/B  - SARS-CoV-2, PCR (In-House); Future  3. Non-intractable vomiting with nausea, unspecified vomiting type  - POCT Influenza A/B  - SARS-CoV-2, PCR (In-House); Future  - CT-ABDOMEN-PELVIS WITH; Future  4. Diarrhea, unspecified type  This is an acute condition along with chills, nausea with now resolved vomiting and improving diarrhea.  Labs and imaging obtained given symptoms and physical exam findings.  Called patient today at 1540 to discuss lab results.  No findings on CT in area of concern, so at this point suspect this to be gastroenteritis.  Recommend she continue Zofran and instead of Tums trial Pepcid AC given elevated Ca.  1/4 SIRS based on HR >90, but I don't suspect sepsis or bacterial infection at this time despite low blood pressure as below, no evidence of end organ damage.  Encouraged hydration and discussed return precautions/ED precautions.  Isolate pending Covid test results.    5. Acute post-traumatic headache, not intractable  Acute, suspect posttraumatic as it has been improving versus viral given above. This has been improving without ongoing vomiting, so will just continue Tylenol as needed for now.    6. Hypotension, unspecified hypotension type  This is an acute finding without tachycardia.  Patient reports her blood pressure typically runs low at a baseline.  Suspect that this is relative due to dehydration given above.  MAP is above 65 at 75 mmHg on repeat BP.  Stressed importance of hydration and should she feel like she is unable to hydrate or becomes lightheaded/presyncopal advised that she return to the emergency department.    7. Hypercalcemia  Noted on her labs today.  She does report a history of nephrolithiasis but that was approximate 10 years ago.  Advised  her to not take any more Tums and we will repeat to trend.    8. Neck pain, chronic  This is a chronic condition, improved from her ER visit.  Recommend stretching, trial of heat and continue Tylenol.  Agree with avoiding methocarbamol for now.    I spent a total of 60 minutes with record review, exam, communication with the patient, and documentation of this encounter.    Return if symptoms worsen or fail to improve.    Please note that this dictation was created using voice recognition software. I have made every reasonable attempt to correct obvious errors, but I expect that there are errors of grammar and possibly content that I did not discover before finalizing the note.

## 2021-09-29 DIAGNOSIS — R11.2 NON-INTRACTABLE VOMITING WITH NAUSEA, UNSPECIFIED VOMITING TYPE: ICD-10-CM

## 2021-09-29 DIAGNOSIS — R68.83 CHILLS (WITHOUT FEVER): ICD-10-CM

## 2021-09-29 LAB — COVID ORDER STATUS COVID19: NORMAL

## 2021-09-30 LAB
SARS-COV-2 RNA RESP QL NAA+PROBE: NOTDETECTED
SPECIMEN SOURCE: NORMAL

## 2021-10-01 ENCOUNTER — OFFICE VISIT (OUTPATIENT)
Dept: MEDICAL GROUP | Facility: PHYSICIAN GROUP | Age: 56
End: 2021-10-01
Payer: OTHER MISCELLANEOUS

## 2021-10-01 ENCOUNTER — TELEPHONE (OUTPATIENT)
Dept: MEDICAL GROUP | Facility: PHYSICIAN GROUP | Age: 56
End: 2021-10-01

## 2021-10-01 VITALS
DIASTOLIC BLOOD PRESSURE: 60 MMHG | TEMPERATURE: 99.1 F | OXYGEN SATURATION: 96 % | HEART RATE: 86 BPM | HEIGHT: 63 IN | SYSTOLIC BLOOD PRESSURE: 98 MMHG | BODY MASS INDEX: 17.72 KG/M2 | WEIGHT: 100 LBS

## 2021-10-01 DIAGNOSIS — R42 LIGHTHEADEDNESS: ICD-10-CM

## 2021-10-01 DIAGNOSIS — R10.13 EPIGASTRIC PAIN: ICD-10-CM

## 2021-10-01 DIAGNOSIS — E83.52 HYPERCALCEMIA: ICD-10-CM

## 2021-10-01 DIAGNOSIS — R11.0 NAUSEA: ICD-10-CM

## 2021-10-01 DIAGNOSIS — R63.4 WEIGHT LOSS: ICD-10-CM

## 2021-10-01 PROCEDURE — 99214 OFFICE O/P EST MOD 30 MIN: CPT | Performed by: STUDENT IN AN ORGANIZED HEALTH CARE EDUCATION/TRAINING PROGRAM

## 2021-10-01 RX ORDER — ONDANSETRON 4 MG/1
4 TABLET, ORALLY DISINTEGRATING ORAL EVERY 6 HOURS PRN
Qty: 10 TABLET | Refills: 0 | Status: SHIPPED | OUTPATIENT
Start: 2021-10-01 | End: 2021-10-12

## 2021-10-01 ASSESSMENT — PATIENT HEALTH QUESTIONNAIRE - PHQ9: CLINICAL INTERPRETATION OF PHQ2 SCORE: 0

## 2021-10-01 ASSESSMENT — FIBROSIS 4 INDEX: FIB4 SCORE: 0.69

## 2021-10-01 NOTE — TELEPHONE ENCOUNTER
Patient is requesting a prescription for nausea, states Zofran isn't helping that much. Patient also mentions that she still isn't feeling well, she scheduled an appointment for 10/5/21 with Dr. Dunlap. Advised pt if she is feeling worst to seek medical care at UC or ER.

## 2021-10-01 NOTE — PROGRESS NOTES
Subjective:     CC: Follow-up, nausea medication    HPI:   Maritza presents today to refill her her nausea medication.    Patient states she is feeling overall the same, but epigastric pain was improved this morning and diarrhea has resolved as of yesterday.  No fever but continues with some chills vs hot flashes, no night sweats.  Reports poor appetite given that epigastric pain does worsen with food.  Has not picked up Pepcid as recommended.  States that Zofran does help with the nausea and has not vomited but not helpful for the pain.  She states that her daughter is now feeling sick as well so wondering again if this is just an infection.    Continues with headache which are improving overall and still responsive to Tylenol.  Continues with dizziness as well especially with laying down and movement but that is also improved.  States her lightheadedness is overall unchanged and she has been attempting to hydrate as best as she can, urine is clear.  Denies any cough, SOB, chest pain or palpitations.    Wonders if her thyroid was checked when she went to the ER because she reports some unintentional weight loss and hair loss even prior to feeling ill.  Wonders if that is related to menopause but when she looked that up usually people report weight gain.    Current Outpatient Medications Ordered in Epic   Medication Sig Dispense Refill   • ondansetron (ZOFRAN ODT) 4 MG TABLET DISPERSIBLE Take 1 Tablet by mouth every 6 hours as needed. 10 Tablet 0   • methocarbamol (ROBAXIN) 750 MG Tab Take 1 Tablet by mouth 3 times a day for 10 days. 30 Tablet 0   • acetaminophen (TYLENOL) 325 MG Tab Take 2 Tablets by mouth every 6 hours as needed for Moderate Pain or Fever (headache). 30 Tablet 0     No current Epic-ordered facility-administered medications on file.     ROS:  See HPI    Objective:     Exam:  BP (!) 98/60 (BP Location: Left arm, Patient Position: Sitting, BP Cuff Size: Adult)   Pulse 86   Temp 37.3 °C (99.1 °F)  "(Temporal)   Ht 1.6 m (5' 3\")   Wt 45.4 kg (100 lb)   LMP  (LMP Unknown)   SpO2 96%   BMI 17.71 kg/m²  Body mass index is 17.71 kg/m².    Physical Exam:  Constitutional: Well-developed and well-nourished. No acute distress.   Skin: Skin is warm and dry. No rash noted.  Head: Atraumatic without lesions.  Eyes: Conjunctivae and extraocular motions are normal. Pupils are equal, round. No scleral icterus.   Mouth/Throat: Wearing mask  Neck: Supple, trachea midline.  Cardiovascular: Regular rate and rhythm, S1 and S2 without murmur, rubs, or gallops.  Lungs: Normal inspiratory effort, CTA bilaterally, no wheezes/rhonchi/rales  Abdomen: Soft, mild epigastric tenderness without distention. Active bowel sounds. No rebound, guarding, masses or HSM.  Extremities: No cyanosis, clubbing, erythema, nor edema.  Neurological: Alert and oriented x 3.  Slightly unsteady gait.  Psychiatric:  Behavior, mood, and affect are appropriate.    Labs: Reviewed again from 9/25/2021, 9/28/2021.    Assessment & Plan:     55 y.o. female with the following -     1. Nausea  This is an acute condition, controlled with Zofran and tolerating.  Refilled.  Encouraged hydration and intake as tolerated.  Discussed ER precautions, has close follow-up with me on Tuesday.  - ondansetron (ZOFRAN ODT) 4 MG TABLET DISPERSIBLE; Take 1 Tablet by mouth every 6 hours as needed.  Dispense: 10 Tablet; Refill: 0    2. Epigastric pain  This is an acute condition, unclear etiology is suspected due to viral illness with negative recent Covid test.  Improved today.  Encouraged her to trial of Pepcid AC over-the-counter.    3. Lightheadedness  This is an acute condition since she had a syncopal episode prompting ER visit on the 25th.  Blood pressure improved today, not tachycardic.  Reviewed her prior EKG/echocardiogram from 2013.  She declines EKG today.  May consider at follow-up if lightheadedness persists given history of recent syncope of unclear etiology aside " from perhaps viral illness as above.    4. Hypercalcemia  Noted on last labs, will trend. Avoid tums.  - Basic Metabolic Panel; Future    5. Weight loss  Patient reports unintended weight loss prior to becoming ill.  Will check labs as below and review cancer screening at follow-up.  - TSH WITH REFLEX TO FT4; Future  - HIV AG/AB COMBO ASSAY SCREENING; Future  - CRP QUANTITIVE (NON-CARDIAC); Future  - Sed Rate; Future  - HEP C VIRUS ANTIBODY; Future    Return in 4 days (on 10/5/2021).    Please note that this dictation was created using voice recognition software. I have made every reasonable attempt to correct obvious errors, but I expect that there are errors of grammar and possibly content that I did not discover before finalizing the note.

## 2021-10-04 ENCOUNTER — HOSPITAL ENCOUNTER (OUTPATIENT)
Dept: LAB | Facility: MEDICAL CENTER | Age: 56
End: 2021-10-04
Attending: STUDENT IN AN ORGANIZED HEALTH CARE EDUCATION/TRAINING PROGRAM
Payer: COMMERCIAL

## 2021-10-04 DIAGNOSIS — E83.52 HYPERCALCEMIA: ICD-10-CM

## 2021-10-04 DIAGNOSIS — R63.4 WEIGHT LOSS: ICD-10-CM

## 2021-10-04 LAB
ANION GAP SERPL CALC-SCNC: 14 MMOL/L (ref 7–16)
BUN SERPL-MCNC: 11 MG/DL (ref 8–22)
CALCIUM SERPL-MCNC: 9.8 MG/DL (ref 8.5–10.5)
CHLORIDE SERPL-SCNC: 101 MMOL/L (ref 96–112)
CO2 SERPL-SCNC: 23 MMOL/L (ref 20–33)
CREAT SERPL-MCNC: 0.68 MG/DL (ref 0.5–1.4)
CRP SERPL HS-MCNC: <0.3 MG/DL (ref 0–0.75)
ERYTHROCYTE [SEDIMENTATION RATE] IN BLOOD BY WESTERGREN METHOD: 2 MM/HOUR (ref 0–25)
GLUCOSE SERPL-MCNC: 95 MG/DL (ref 65–99)
HCV AB SER QL: NORMAL
HIV 1+2 AB+HIV1 P24 AG SERPL QL IA: NORMAL
POTASSIUM SERPL-SCNC: 3.6 MMOL/L (ref 3.6–5.5)
SODIUM SERPL-SCNC: 138 MMOL/L (ref 135–145)
TSH SERPL DL<=0.005 MIU/L-ACNC: 2.79 UIU/ML (ref 0.38–5.33)

## 2021-10-04 PROCEDURE — 85652 RBC SED RATE AUTOMATED: CPT

## 2021-10-04 PROCEDURE — 86140 C-REACTIVE PROTEIN: CPT

## 2021-10-04 PROCEDURE — 80048 BASIC METABOLIC PNL TOTAL CA: CPT

## 2021-10-04 PROCEDURE — 84443 ASSAY THYROID STIM HORMONE: CPT

## 2021-10-04 PROCEDURE — 87389 HIV-1 AG W/HIV-1&-2 AB AG IA: CPT

## 2021-10-04 PROCEDURE — 36415 COLL VENOUS BLD VENIPUNCTURE: CPT

## 2021-10-04 PROCEDURE — 86803 HEPATITIS C AB TEST: CPT

## 2021-10-05 ENCOUNTER — OFFICE VISIT (OUTPATIENT)
Dept: MEDICAL GROUP | Facility: PHYSICIAN GROUP | Age: 56
End: 2021-10-05
Payer: OTHER MISCELLANEOUS

## 2021-10-05 VITALS
WEIGHT: 100.6 LBS | TEMPERATURE: 98.2 F | HEIGHT: 63 IN | HEART RATE: 106 BPM | OXYGEN SATURATION: 97 % | BODY MASS INDEX: 17.82 KG/M2 | DIASTOLIC BLOOD PRESSURE: 62 MMHG | SYSTOLIC BLOOD PRESSURE: 88 MMHG

## 2021-10-05 DIAGNOSIS — Z12.31 ENCOUNTER FOR SCREENING MAMMOGRAM FOR BREAST CANCER: ICD-10-CM

## 2021-10-05 DIAGNOSIS — R63.6 UNDERWEIGHT: ICD-10-CM

## 2021-10-05 DIAGNOSIS — Z12.11 ENCOUNTER FOR SCREENING FOR MALIGNANT NEOPLASM OF COLON: ICD-10-CM

## 2021-10-05 DIAGNOSIS — R42 LIGHTHEADEDNESS: ICD-10-CM

## 2021-10-05 PROCEDURE — 93000 ELECTROCARDIOGRAM COMPLETE: CPT | Performed by: STUDENT IN AN ORGANIZED HEALTH CARE EDUCATION/TRAINING PROGRAM

## 2021-10-05 PROCEDURE — 99214 OFFICE O/P EST MOD 30 MIN: CPT | Performed by: STUDENT IN AN ORGANIZED HEALTH CARE EDUCATION/TRAINING PROGRAM

## 2021-10-05 ASSESSMENT — FIBROSIS 4 INDEX: FIB4 SCORE: 0.7

## 2021-10-05 NOTE — PROGRESS NOTES
"Subjective:     CC: follow up    HPI:   Maritza presents today for follow-up from last visit.    Patient feels that she is overall improving.  Still feels sort of out of it and intermittently lightheaded without additional episode of syncope.  States that she feels dizzy when she lays down, but her headaches are improving and now mild.  She is now eating normally and denies any abdominal pain with eating.  No abdominal pain today and has not used Zofran since Friday, denies nausea vomiting.  No fever or chills.  No further diarrhea, not slightly constipated.  Drinking plenty of fluids.    Regarding her prior unintended weight loss we looked back and she was 115 pounds in December 2020.  She states that there was a period where she had indigestion tried a probiotic which seemed to help a little bit but then started to lose weight without trying.  Of course since she has been sick with this recent illness she is also lost weight, but stable from last visit.  Does not feel ready to go back to work quite yet but hoping when she is scheduled on Sunday she can return.    Current Outpatient Medications Ordered in Epic   Medication Sig Dispense Refill   • ondansetron (ZOFRAN ODT) 4 MG TABLET DISPERSIBLE Take 1 Tablet by mouth every 6 hours as needed. 10 Tablet 0   • methocarbamol (ROBAXIN) 750 MG Tab Take 1 Tablet by mouth 3 times a day for 10 days. 30 Tablet 0   • acetaminophen (TYLENOL) 325 MG Tab Take 2 Tablets by mouth every 6 hours as needed for Moderate Pain or Fever (headache). 30 Tablet 0     No current Epic-ordered facility-administered medications on file.     ROS:  See HPI    Objective:     Exam:  BP (!) 88/62 (BP Location: Left arm, Patient Position: Sitting, BP Cuff Size: Adult)   Pulse (!) 106   Temp 36.8 °C (98.2 °F) (Temporal)   Ht 1.6 m (5' 3\")   Wt 45.6 kg (100 lb 9.6 oz)   LMP  (LMP Unknown)   SpO2 97%   BMI 17.82 kg/m²  Body mass index is 17.82 kg/m².  MAP 71    Physical Exam:  Constitutional: " Well-developed and well-nourished, but thin. No acute distress.   Skin: Skin is warm and dry. No rash noted.  Head: Atraumatic without lesions.  Eyes: Conjunctivae and extraocular motions are normal. Pupils are equal, round. No scleral icterus.   Mouth/Throat: Wearing mask  Neck: Supple, trachea midline.  Cardiovascular: Regular rate and rhythm, S1 and S2 without murmur, rubs, or gallops.  Lungs: Normal inspiratory effort, CTA bilaterally, no wheezes/rhonchi/rales  Abdomen: Soft, non tender, and without distention. Active bowel sounds. No rebound, guarding, masses or HSM.  Extremities: No cyanosis, clubbing, erythema, nor edema.  Neurological: Alert and oriented x 3. No gross/focal deficits.  Psychiatric:  Behavior, mood, and affect are appropriate.    Labs: Reviewed from 9/28/2021, 10/4/2021  EKG Interpretation   Ordered and interpreted by Loren Dunlap DO  Rhythm: normal sinus   Rate: 80  Axis: normal   Intervals: normal, [id number]  Ectopy: none   Conduction: normal   ST Segments: no elevations  T Waves:normal  Q Waves: none   Clinical Impression: normal sinus rhythm without ischemia/infarction, ectopy or arhythmia.     Assessment & Plan:     56 y.o. female with the following -     1. Lightheadedness  This is an acute condition since she had a unwitnessed syncopal episode on September 25-etiology of syncope is still unclear but may have been related to likely viral infection that preceded.  EKG without concerning findings as above and cardiovascular exam normal aside from low blood pressure with normal MAP.  Since she did hit her head when she fell some of this lightheadedness may also be related to the dizziness which has been improving along with now very mild headache.  For now I recommend she stay off work to rest, hydrate and avoid skipping meals.  Released to work on the 10th should she feel at her baseline if not she will let me know.  Should this worsen, would consider referral to cardiology.  - EKG - Clinic  Performed    2. Underweight  This is been noted for the past few months and worsened by recent likely viral gastroenteritis.  Labs reviewed with patient and normal.  I will follow her up in a month to trend her weight and in the meantime we will get to work updating her cancer screening. Encouraged healthy diet as tolerated.    3. Encounter for screening mammogram for breast cancer  - MA-SCREENING MAMMO BILAT W/TOMOSYNTHESIS W/CAD; Future    4. Encounter for screening for malignant neoplasm of colon  - REFERRAL TO GI FOR COLONOSCOPY    I spent a total of 30 minutes with record review, exam, communication with the patient, and documentation of this encounter.    Return in about 4 weeks (around 11/2/2021) for Annual with PAP.    Please note that this dictation was created using voice recognition software. I have made every reasonable attempt to correct obvious errors, but I expect that there are errors of grammar and possibly content that I did not discover before finalizing the note.

## 2021-10-12 ENCOUNTER — OFFICE VISIT (OUTPATIENT)
Dept: MEDICAL GROUP | Facility: PHYSICIAN GROUP | Age: 56
End: 2021-10-12
Payer: OTHER MISCELLANEOUS

## 2021-10-12 VITALS
BODY MASS INDEX: 18.61 KG/M2 | TEMPERATURE: 99 F | SYSTOLIC BLOOD PRESSURE: 110 MMHG | OXYGEN SATURATION: 96 % | HEART RATE: 95 BPM | WEIGHT: 105 LBS | DIASTOLIC BLOOD PRESSURE: 62 MMHG | HEIGHT: 63 IN

## 2021-10-12 DIAGNOSIS — Z87.898 HISTORY OF SYNCOPE: ICD-10-CM

## 2021-10-12 DIAGNOSIS — F07.81 POST CONCUSSION SYNDROME: ICD-10-CM

## 2021-10-12 DIAGNOSIS — R42 EPISODIC LIGHTHEADEDNESS: ICD-10-CM

## 2021-10-12 DIAGNOSIS — Z02.89 ENCOUNTER FOR COMPLETION OF FORM WITH PATIENT: ICD-10-CM

## 2021-10-12 PROCEDURE — 99214 OFFICE O/P EST MOD 30 MIN: CPT | Performed by: STUDENT IN AN ORGANIZED HEALTH CARE EDUCATION/TRAINING PROGRAM

## 2021-10-12 RX ORDER — AMITRIPTYLINE HYDROCHLORIDE 10 MG/1
10 TABLET, FILM COATED ORAL EVERY EVENING
Qty: 30 TABLET | Refills: 0 | Status: SHIPPED | OUTPATIENT
Start: 2021-10-12 | End: 2021-10-22

## 2021-10-12 RX ORDER — MECLIZINE HYDROCHLORIDE 25 MG/1
12.5-25 TABLET ORAL 3 TIMES DAILY PRN
Qty: 30 TABLET | Refills: 0 | Status: SHIPPED | OUTPATIENT
Start: 2021-10-12 | End: 2021-10-22

## 2021-10-12 ASSESSMENT — FIBROSIS 4 INDEX: FIB4 SCORE: 0.7
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
## 2021-10-13 NOTE — PATIENT INSTRUCTIONS
Post-Concussion Syndrome    A concussion is a brain injury from a direct hit (blow) to your head or body. This blow causes your brain to shake quickly back and forth inside your skull. This can damage brain cells and cause chemical changes in your brain. Concussions are usually not life-threatening but can cause several serious symptoms.  Post-concussion syndrome is when symptoms that occur after a concussion last longer than normal. These symptoms can last from weeks to months.  What are the causes?  The cause of this condition is not known. It can happen whether your head injury was mild or severe.  What increases the risk?  You are more likely to develop this condition if:  · You are female.  · You are a child, teen, or young adult.  · You had a past head injury.  · You have a history of headaches.  · You have depression or anxiety.  What are the signs or symptoms?  Physical symptoms  · Headaches.  · Tiredness.  · Dizziness.  · Weakness.  · Blurry vision.  · Sensitivity to light.  · Hearing difficulties.  Mental and emotional symptoms  · Memory difficulties.  · Difficulty with concentration.  · Difficulty sleeping or staying asleep.  · Feeling irritable.  · Anxiety or depression.  · Difficulty learning new things.  How is this diagnosed?  This condition may be diagnosed based on:  · Your symptoms.  · A description of your injury.  · Your medical history.  Your health care provider may order other tests such as:  · Brain function tests (neurological testing).  · CT scan.  How is this treated?  Treatment for this condition may depend on your symptoms. Symptoms usually go away on their own over time. Treatments may include:  · Medicines for headaches.  · Resting your brain and body for a few days after your injury.  · Rehabilitation therapy, such as:  ? Physical or occupational therapy. This may include exercises to help with balance and dizziness.  ? Mental health counseling.  ? Speech therapy.  ? Vision therapy. A  brain and eye specialist can recommend treatments for vision problems.  Follow these instructions at home:  Medicines  · Take over-the-counter and prescription medicines only as told by your health care provider.  · Avoid opioid prescription pain medicines when recovering from a concussion.  Activity  · Limit your mental activities for the first few days after your injury, such as:  ? Homework or job-related work.  ? Complex thinking.  ? Watching TV, and using a computer or phone.  ? Playing memory games and puzzles.  ? Gradually return to your normal activity level. If a certain activity brings on your symptoms, stop or slow down until you can do the activity without it triggering your symptoms.  · Limit physical activity, such as exercise or sports, for the first few days after a concussion. Gradually return to normal activity as told by your health care provider.  ? If a certain activity brings on your symptoms, stop or slow down until you can do the activity without it triggering your symptoms.  · Rest. Rest helps your brain heal. Make sure you:  ? Get plenty of sleep at night. Most adults should get at least 7-9 hours of sleep each night.  ? Rest during the day. Take naps or rest breaks when you feel tired.  · Do not do high-risk activities that could cause a second concussion, such as riding a bike or playing sports. Having another concussion before the first one has healed can be dangerous.  General instructions  · Do not drink alcohol until your health care provider says you can.  · Keep track of the frequency and the severity of your symptoms. Give this information to your health care provider.  · Keep all follow-up visits as directed by your health care provider. This is important.  Contact a health care provider if:  · Your symptoms do not improve.  · You have another injury.  Get help right away if you:  · Have a severe or worsening headache.  · Are confused.  · Have trouble staying awake.  · Pass  out.  · Vomit.  · Have weakness or numbness in any part of your body.  · Have a seizure.  · Have trouble speaking.  Summary  · Post-concussion syndrome is when symptoms that occur after a concussion last longer than normal.  · Symptoms usually go away on their own over time. Depending on your symptoms, you may need treatment, such as medicines or rehabilitation therapy.  · Rest your brain and body for a few days after your injury. Gradually return to activities, as told by your health care provider.  · Get plenty of sleep, and avoid alcohol and opioid pain medicines while recovering from a concussion.  This information is not intended to replace advice given to you by your health care provider. Make sure you discuss any questions you have with your health care provider.  Document Released: 06/09/2003 Document Revised: 10/10/2019 Document Reviewed: 01/22/2019  Elsevier Patient Education © 2020 [employer]

## 2021-10-22 ENCOUNTER — OFFICE VISIT (OUTPATIENT)
Dept: MEDICAL GROUP | Facility: PHYSICIAN GROUP | Age: 56
End: 2021-10-22
Payer: COMMERCIAL

## 2021-10-22 VITALS
OXYGEN SATURATION: 98 % | HEART RATE: 78 BPM | SYSTOLIC BLOOD PRESSURE: 100 MMHG | DIASTOLIC BLOOD PRESSURE: 70 MMHG | HEIGHT: 63 IN | BODY MASS INDEX: 18.57 KG/M2 | TEMPERATURE: 97.2 F | WEIGHT: 104.8 LBS

## 2021-10-22 DIAGNOSIS — G89.29 NECK PAIN, CHRONIC: ICD-10-CM

## 2021-10-22 DIAGNOSIS — Z72.0 TOBACCO USE: ICD-10-CM

## 2021-10-22 DIAGNOSIS — F07.81 POSTCONCUSSION SYNDROME: ICD-10-CM

## 2021-10-22 DIAGNOSIS — M54.2 NECK PAIN, CHRONIC: ICD-10-CM

## 2021-10-22 PROCEDURE — 99214 OFFICE O/P EST MOD 30 MIN: CPT | Performed by: STUDENT IN AN ORGANIZED HEALTH CARE EDUCATION/TRAINING PROGRAM

## 2021-10-22 RX ORDER — MELOXICAM 15 MG/1
15 TABLET ORAL DAILY
Qty: 90 TABLET | Refills: 3 | Status: SHIPPED | OUTPATIENT
Start: 2021-10-22 | End: 2023-06-13

## 2021-10-22 RX ORDER — IBUPROFEN 200 MG
200 TABLET ORAL EVERY 6 HOURS PRN
COMMUNITY
End: 2021-10-22

## 2021-10-22 ASSESSMENT — FIBROSIS 4 INDEX: FIB4 SCORE: 0.7

## 2021-10-22 NOTE — PATIENT INSTRUCTIONS
Bupropion tablets (Depression/Mood Disorders)  What is this medicine?  BUPROPION (byoo PROE pee on) is used to treat depression.  This medicine may be used for other purposes; ask your health care provider or pharmacist if you have questions.  COMMON BRAND NAME(S): Wellbutrin  What should I tell my health care provider before I take this medicine?  They need to know if you have any of these conditions:  · an eating disorder, such as anorexia or bulimia  · bipolar disorder or psychosis  · diabetes or high blood sugar, treated with medication  · glaucoma  · heart disease, previous heart attack, or irregular heart beat  · head injury or brain tumor  · high blood pressure  · kidney or liver disease  · seizures  · suicidal thoughts or a previous suicide attempt  · Tourette's syndrome  · weight loss  · an unusual or allergic reaction to bupropion, other medicines, foods, dyes, or preservatives  · breast-feeding  · pregnant or trying to become pregnant  How should I use this medicine?  Take this medicine by mouth with a glass of water. Follow the directions on the prescription label. You can take it with or without food. If it upsets your stomach, take it with food. Take your medicine at regular intervals. Do not take your medicine more often than directed. Do not stop taking this medicine suddenly except upon the advice of your doctor. Stopping this medicine too quickly may cause serious side effects or your condition may worsen.  A special MedGuide will be given to you by the pharmacist with each prescription and refill. Be sure to read this information carefully each time.  Talk to your pediatrician regarding the use of this medicine in children. Special care may be needed.  Overdosage: If you think you have taken too much of this medicine contact a poison control center or emergency room at once.  NOTE: This medicine is only for you. Do not share this medicine with others.  What if I miss a dose?  If you miss a dose,  take it as soon as you can. If it is less than four hours to your next dose, take only that dose and skip the missed dose. Do not take double or extra doses.  What may interact with this medicine?  Do not take this medicine with any of the following medications:  · linezolid  · MAOIs like Azilect, Carbex, Eldepryl, Marplan, Nardil, and Parnate  · methylene blue (injected into a vein)  · other medicines that contain bupropion like Zyban  This medicine may also interact with the following medications:  · alcohol  · certain medicines for anxiety or sleep  · certain medicines for blood pressure like metoprolol, propranolol  · certain medicines for depression or psychotic disturbances  · certain medicines for HIV or AIDS like efavirenz, lopinavir, nelfinavir, ritonavir  · certain medicines for irregular heart beat like propafenone, flecainide  · certain medicines for Parkinson's disease like amantadine, levodopa  · certain medicines for seizures like carbamazepine, phenytoin, phenobarbital  · cimetidine  · clopidogrel  · cyclophosphamide  · digoxin  · furazolidone  · isoniazid  · nicotine  · orphenadrine  · procarbazine  · steroid medicines like prednisone or cortisone  · stimulant medicines for attention disorders, weight loss, or to stay awake  · tamoxifen  · theophylline  · thiotepa  · ticlopidine  · tramadol  · warfarin  This list may not describe all possible interactions. Give your health care provider a list of all the medicines, herbs, non-prescription drugs, or dietary supplements you use. Also tell them if you smoke, drink alcohol, or use illegal drugs. Some items may interact with your medicine.  What should I watch for while using this medicine?  Tell your doctor if your symptoms do not get better or if they get worse. Visit your doctor or healthcare provider for regular checks on your progress. Because it may take several weeks to see the full effects of this medicine, it is important to continue your  treatment as prescribed by your doctor.  This medicine may cause serious skin reactions. They can happen weeks to months after starting the medicine. Contact your healthcare provider right away if you notice fevers or flu-like symptoms with a rash. The rash may be red or purple and then turn into blisters or peeling of the skin. Or, you might notice a red rash with swelling of the face, lips or lymph nodes in your neck or under your arms.  Patients and their families should watch out for new or worsening thoughts of suicide or depression. Also watch out for sudden changes in feelings such as feeling anxious, agitated, panicky, irritable, hostile, aggressive, impulsive, severely restless, overly excited and hyperactive, or not being able to sleep. If this happens, especially at the beginning of treatment or after a change in dose, call your healthcare provider.  Avoid alcoholic drinks while taking this medicine. Drinking excessive alcoholic beverages, using sleeping or anxiety medicines, or quickly stopping the use of these agents while taking this medicine may increase your risk for a seizure.  Do not drive or use heavy machinery until you know how this medicine affects you. This medicine can impair your ability to perform these tasks.  Do not take this medicine close to bedtime. It may prevent you from sleeping.  Your mouth may get dry. Chewing sugarless gum or sucking hard candy, and drinking plenty of water may help. Contact your doctor if the problem does not go away or is severe.  What side effects may I notice from receiving this medicine?  Side effects that you should report to your doctor or health care professional as soon as possible:  · allergic reactions like skin rash, itching or hives, swelling of the face, lips, or tongue  · breathing problems  · changes in vision  · confusion  · elevated mood, decreased need for sleep, racing thoughts, impulsive behavior  · fast or irregular  heartbeat  · hallucinations, loss of contact with reality  · increased blood pressure  · rash, fever, and swollen lymph nodes  · redness, blistering, peeling, or loosening of the skin, including inside the mouth  · seizures  · suicidal thoughts or other mood changes  · unusually weak or tired  · vomiting  Side effects that usually do not require medical attention (report to your doctor or health care professional if they continue or are bothersome):  · constipation  · headache  · loss of appetite  · nausea  · tremors  · weight loss  This list may not describe all possible side effects. Call your doctor for medical advice about side effects. You may report side effects to FDA at 6-976-VUW-8473.  Where should I keep my medicine?  Keep out of the reach of children.  Store at room temperature between 20 and 25 degrees C (68 and 77 degrees F), away from direct sunlight and moisture. Keep tightly closed. Throw away any unused medicine after the expiration date.  NOTE: This sheet is a summary. It may not cover all possible information. If you have questions about this medicine, talk to your doctor, pharmacist, or health care provider.  © 2020 [employer] (2020-03-12 14:02:47)

## 2021-10-22 NOTE — PROGRESS NOTES
"Subjective:     CC: Follow-up and discuss going back to work    HPI:   Maritza presents today to follow-up and discuss going back to work.    She is feeling improvement. Dizziness is less frequent and she can now bend/tilt her head back without dizziness. Normal gait, no chest pain/palpitations or lightheadedness. Some headache that improves with ibuprofen, but improving. Didn't tolerate elavil so stopped.    She has been rx mobic 15mg daily prior for neck pain and found this to be most effective. Would like to switch. Denied adverse SE prior and took for about 3 months.     Current Outpatient Medications Ordered in Epic   Medication Sig Dispense Refill   • ibuprofen (MOTRIN) 200 MG Tab Take 200 mg by mouth every 6 hours as needed.     • Multiple Vitamin (MULTI-VITAMIN DAILY PO) Take  by mouth.       No current Epic-ordered facility-administered medications on file.     ROS:  Gen: no fevers/chills, no changes in weight  Eyes: no changes in vision  ENT: no sore throat  Pulm: no sob, no cough  CV: no chest pain, no palpitations  GI: no nausea/vomiting, no diarrhea  Skin: no rash  Neuro: no numbness/tingling  Heme/Lymph: no easy bruising    Objective:     Exam:  /70 (BP Location: Left arm, Patient Position: Sitting, BP Cuff Size: Small adult)   Pulse 78   Temp 36.2 °C (97.2 °F) (Temporal)   Ht 1.6 m (5' 3\")   Wt 47.5 kg (104 lb 12.8 oz)   LMP  (LMP Unknown)   SpO2 98%   BMI 18.56 kg/m²  Body mass index is 18.56 kg/m².    Physical Exam:  Constitutional: Well-developed and well-nourished. No acute distress.   Skin: Skin is warm and dry. No rash noted.  Head: Atraumatic without lesions.  Eyes: Conjunctivae and extraocular motions are normal. Pupils are equal, round and reactive to light. No scleral icterus.   Ears:  External ears unremarkable. Tympanic membranes clear and intact.  Nose: Nares patent.  No discharge.  Mouth/Throat: Oropharynx is clear and moist. Posterior pharynx without erythema or " exudates.  Neck: Supple, trachea midline.  Cardiovascular: Regular rate and rhythm, S1 and S2 without murmur, rubs, or gallops.  Lungs: Normal inspiratory effort, CTA bilaterally, no wheezes/rhonchi/rales  Extremities: No cyanosis, clubbing, erythema, nor edema.   Neurological: Alert and oriented x 3. No gross/focal deficits. Normal gait. CN II-XII intact.  Psychiatric:  Behavior, mood, and affect are appropriate.    Assessment & Plan:     56 y.o. female with the following -     1. Postconcussion syndrome  Acute, improving. Work note for return 10/26, but if improved can trial going back 10/24 (Sunday).    2. Neck pain, chronic  Chronic, mobic effective prior so stop ibuprofen and restart mobic as below. Ensure to take with food/plenty of water.  - meloxicam (MOBIC) 15 MG tablet; Take 1 Tablet by mouth every day.  Dispense: 90 Tablet; Refill: 3    3. Tobacco use  Chronic. Not interested in cessation at this time.    Return in about 2 weeks (around 11/5/2021), or if symptoms worsen or fail to improve, for Annual with PAP.    Please note that this dictation was created using voice recognition software. I have made every reasonable attempt to correct obvious errors, but I expect that there are errors of grammar and possibly content that I did not discover before finalizing the note.

## 2021-11-05 ENCOUNTER — HOSPITAL ENCOUNTER (OUTPATIENT)
Facility: MEDICAL CENTER | Age: 56
End: 2021-11-05
Attending: STUDENT IN AN ORGANIZED HEALTH CARE EDUCATION/TRAINING PROGRAM
Payer: COMMERCIAL

## 2021-11-05 ENCOUNTER — OFFICE VISIT (OUTPATIENT)
Dept: MEDICAL GROUP | Facility: PHYSICIAN GROUP | Age: 56
End: 2021-11-05
Payer: COMMERCIAL

## 2021-11-05 VITALS
HEART RATE: 63 BPM | BODY MASS INDEX: 18.85 KG/M2 | WEIGHT: 106.4 LBS | HEIGHT: 63 IN | SYSTOLIC BLOOD PRESSURE: 100 MMHG | OXYGEN SATURATION: 97 % | DIASTOLIC BLOOD PRESSURE: 72 MMHG | TEMPERATURE: 97.9 F

## 2021-11-05 DIAGNOSIS — Z01.419 WELL WOMAN EXAM: ICD-10-CM

## 2021-11-05 DIAGNOSIS — Z72.0 TOBACCO USE: ICD-10-CM

## 2021-11-05 DIAGNOSIS — E78.2 MIXED HYPERLIPIDEMIA: ICD-10-CM

## 2021-11-05 DIAGNOSIS — Z11.51 ENCOUNTER FOR SCREENING FOR HUMAN PAPILLOMAVIRUS (HPV): ICD-10-CM

## 2021-11-05 DIAGNOSIS — Z23 NEED FOR VACCINATION: ICD-10-CM

## 2021-11-05 DIAGNOSIS — Z12.4 ENCOUNTER FOR PAPANICOLAOU SMEAR FOR CERVICAL CANCER SCREENING: ICD-10-CM

## 2021-11-05 PROCEDURE — 87624 HPV HI-RISK TYP POOLED RSLT: CPT

## 2021-11-05 PROCEDURE — 90686 IIV4 VACC NO PRSV 0.5 ML IM: CPT | Performed by: STUDENT IN AN ORGANIZED HEALTH CARE EDUCATION/TRAINING PROGRAM

## 2021-11-05 PROCEDURE — 88175 CYTOPATH C/V AUTO FLUID REDO: CPT

## 2021-11-05 PROCEDURE — 99396 PREV VISIT EST AGE 40-64: CPT | Mod: 25 | Performed by: STUDENT IN AN ORGANIZED HEALTH CARE EDUCATION/TRAINING PROGRAM

## 2021-11-05 PROCEDURE — 90471 IMMUNIZATION ADMIN: CPT | Performed by: STUDENT IN AN ORGANIZED HEALTH CARE EDUCATION/TRAINING PROGRAM

## 2021-11-05 RX ORDER — LORATADINE 10 MG/1
CAPSULE, LIQUID FILLED ORAL
COMMUNITY
End: 2023-06-13

## 2021-11-05 ASSESSMENT — FIBROSIS 4 INDEX: FIB4 SCORE: 0.7

## 2021-11-05 NOTE — PROGRESS NOTES
Subjective:     CC: PAP    HPI:   Maritza Shaffer is a 56 y.o. female who presents for annual exam. She is feeling well and denies any complaints aside from improving dizziness.    Ob-Gyn/ History:    Patient has GYN provider: no  /Para:   Last Pap Smear:  At least 3yrs ago. Denies history of abnormal pap smears.  Gyn Surgery:  BTL  Post menopausal (about 6 years ago). Not currently sexually active.  No significant bloating/fluid retention, pelvic pain. Prior would have dyspareunia (after menopause-deep pain like it was hitting her naval, only certain positions), no intercourse in 3yrs. No vaginal discharge  Post-menopausal bleeding: none  Urinary incontinence: denies    Health Maintenance  Cholesterol Screenin2021 The 10-year ASCVD risk score (Shawanda JIANG Jr., et al., 2013) is: 3.7%  Diabetes Screening: normal 10/4/2021  Diet: could be improved  Exercise: very active at work, likely walks at least 10 miles  Substance Abuse: denies    Cancer screening  Colorectal Cancer Screening: Referred for colonoscopy 10/5/2021  Lung Cancer Screenin/2ppd since 17yo.  Cervical Cancer Screening: cotest today  Breast Cancer Screening: mammogram ordered 10/5/2021    Infectious disease screening/Immunizations  --STI Screening: Not indicated, last intercourse was 3yrs ago.  --HIV Screening: 10/2021 negative  --Hepatitis C Screening: 10/2021 negative  --Immunizations:    Influenza: today   Tetanus: 10/17/2012   Shingles: recommended, declines for now   Pneumococcal : recommended, declines for now   Other immunizations: recommend COVID vaccine    She  has a past medical history of Kidney stones, Nephrolithiasis (), Palpitations (2013), Staphylococcus infection in conditions classified elsewhere and of unspecified site, and Syncope (2013). She also has no past medical history of Breast cancer (HCC).  She  has a past surgical history that includes other orthopedic surgery; other; cervical disk and  fusion anterior (10/31/08); cholecystectomy; and tubal coagulation laparoscopic bilateral (2001).    Family History   Problem Relation Age of Onset   • Cancer Maternal Grandmother      Social History     Socioeconomic History   • Marital status:      Spouse name: Not on file   • Number of children: Not on file   • Years of education: Not on file   • Highest education level: Not on file   Occupational History   • Not on file   Tobacco Use   • Smoking status: Current Every Day Smoker     Packs/day: 0.50     Years: 40.00     Pack years: 20.00     Types: Cigarettes     Start date: 11/5/1977   • Smokeless tobacco: Never Used   • Tobacco comment: 0.5 pack age 15yo, more prior   Vaping Use   • Vaping Use: Never used   Substance and Sexual Activity   • Alcohol use: Not Currently     Comment: occ   • Drug use: No   • Sexual activity: Not Currently   Other Topics Concern   • Not on file   Social History Narrative    , but .     Patient Active Problem List    Diagnosis Date Noted   • Mixed hyperlipidemia 11/05/2021   • Tobacco use 10/22/2021   • Neck pain, chronic 09/28/2021     Current Outpatient Medications   Medication Sig Dispense Refill   • Loratadine (CLARITIN) 10 MG Cap Take  by mouth.     • Multiple Vitamin (MULTI-VITAMIN DAILY PO) Take  by mouth.     • meloxicam (MOBIC) 15 MG tablet Take 1 Tablet by mouth every day. 90 Tablet 3     No current facility-administered medications for this visit.     No Known Allergies    Review of Systems   Constitutional: Negative for fever, chills and malaise/fatigue. Has gained a pound since last visit.  HENT: Negative for congestion.    Eyes: Negative for pain.    Respiratory: Negative for cough and shortness of breath.  Cardiovascular: Negative for leg swelling.   Gastrointestinal: Negative for nausea, vomiting, abdominal pain and diarrhea.   Genitourinary: Negative for dysuria and hematuria.   Skin: Negative for rash.   Neurological: Negative focal weakness  "and headaches. Dizziness is improved, mild and occurs about twice a day.  Endo/Heme/Allergies: Does not bleed easily.   Psychiatric/Behavioral: Negative for depression.  The patient is not nervous/anxious.      Objective:     /72 (BP Location: Left arm, Patient Position: Sitting, BP Cuff Size: Adult)   Pulse 63   Temp 36.6 °C (97.9 °F) (Temporal)   Ht 1.6 m (5' 3\")   Wt 48.3 kg (106 lb 6.4 oz)   LMP  (LMP Unknown)   SpO2 97%   BMI 18.85 kg/m²   Body mass index is 18.85 kg/m².  Wt Readings from Last 4 Encounters:   11/05/21 48.3 kg (106 lb 6.4 oz)   10/22/21 47.5 kg (104 lb 12.8 oz)   10/12/21 47.6 kg (105 lb)   10/05/21 45.6 kg (100 lb 9.6 oz)     Physical Exam:  Constitutional: Well-developed and well-nourished. Not diaphoretic. No distress.   Skin: Skin is warm and dry. No rash noted.  Head: Atraumatic without lesions.  Eyes: Conjunctivae and extraocular motions are normal. Pupils are equal, round. No scleral icterus.   Mouth/Throat: Wearing mask  Neck: Supple, trachea midline.  Cardiovascular: Regular rate and rhythm, S1 and S2 without murmur, rubs, or gallops.  Lungs: Normal inspiratory effort, CTA bilaterally, no wheezes/rhonchi/rales  Breast: Breasts examined seated and supine. No skin changes, peau d'orange or nipple retraction. No discharge. No axillary or supraclavicular adenopathy. No masses or nodularity palpable.  Abdomen: Soft, non tender, and without distention. No rebound, guarding, masses or HSM.  : Perineum and external genitalia normal without rash. Vagina with normal and physiologic discharge. Cervix without visible lesions or discharge, menopausal changes noted. Bimanual exam without adnexal masses or cervical motion tenderness. Mild, non-reproducible right adnexal ttp.  Extremities: No cyanosis, clubbing, erythema, nor edema.  Neurological: Alert and oriented x 3.  Normal gait.  No gross abnormalities.  Psychiatric:  Behavior, mood, and affect are appropriate.    A chaperone was " offered to the patient during today's exam. Chaperone name: Amira Mike MA was present.    Assessment and Plan:     1. Well woman exam  HCM: As above, discussed.  Immunizations per orders, declines others for now but discussed benefits.  Age-appropriate anticipatory guidance discussed.    2. Encounter for Papanicolaou smear for cervical cancer screening  3. Encounter for screening for human papillomavirus (HPV)  Denies prior abnormal, cotesting today and if normal then repeat in 5 years.  - THINPREP PAP W/HPV ; Future    4. Tobacco use  This is a chronic condition.  Patient aware of benefits of cessation but declines intervention at this time.  We will continue to encourage cessation.    5. Mixed hyperlipidemia  Noted in September, not at threshold for statin.  Encourage dietary changes and tobacco cessation.  The 10-year ASCVD risk score (Shawanda DC Jr., et al., 2013) is: 3.7%    6. Need for vaccination  - INFLUENZA VACCINE QUAD INJ (PF)    Follow-up: Return in about 9 months (around 8/5/2022), or if symptoms worsen or fail to improve.

## 2021-11-06 DIAGNOSIS — Z11.51 ENCOUNTER FOR SCREENING FOR HUMAN PAPILLOMAVIRUS (HPV): ICD-10-CM

## 2021-11-06 DIAGNOSIS — Z12.4 ENCOUNTER FOR PAPANICOLAOU SMEAR FOR CERVICAL CANCER SCREENING: ICD-10-CM

## 2021-11-10 LAB
CYTOLOGY REG CYTOL: NORMAL
HPV HR 12 DNA CVX QL NAA+PROBE: NEGATIVE
HPV16 DNA SPEC QL NAA+PROBE: NEGATIVE
HPV18 DNA SPEC QL NAA+PROBE: NEGATIVE
SPECIMEN SOURCE: NORMAL

## 2021-12-20 ENCOUNTER — TELEPHONE (OUTPATIENT)
Dept: CARDIOLOGY | Facility: MEDICAL CENTER | Age: 56
End: 2021-12-20

## 2021-12-20 NOTE — TELEPHONE ENCOUNTER
Chart Prep  LVM for Pt to CB in regards to requesting records for NP appt with Dr. Valentino. Primarily seeking records on any prior cardiologist, any recent cardiac testing/imaging done outside of [facility] and the patient's most recent labs. LVM to CB @[phone].

## 2021-12-21 NOTE — TELEPHONE ENCOUNTER
Pt returning call and states that she has never seen a cardiologist outside of [facility] but did see one within [facility] several years ago but does not remember who it was. Pt states no Labs, EKG's or other testing done outside of [facility]. Please call pt back if you have any further questions at [phone].    Thank you

## 2021-12-23 ENCOUNTER — APPOINTMENT (OUTPATIENT)
Dept: CARDIOLOGY | Facility: MEDICAL CENTER | Age: 56
End: 2021-12-23
Attending: STUDENT IN AN ORGANIZED HEALTH CARE EDUCATION/TRAINING PROGRAM
Payer: COMMERCIAL

## 2022-01-28 ENCOUNTER — APPOINTMENT (OUTPATIENT)
Dept: CARDIOLOGY | Facility: MEDICAL CENTER | Age: 57
End: 2022-01-28
Attending: STUDENT IN AN ORGANIZED HEALTH CARE EDUCATION/TRAINING PROGRAM
Payer: COMMERCIAL

## 2023-06-13 ENCOUNTER — APPOINTMENT (OUTPATIENT)
Dept: RADIOLOGY | Facility: MEDICAL CENTER | Age: 58
End: 2023-06-13
Attending: EMERGENCY MEDICINE
Payer: COMMERCIAL

## 2023-06-13 ENCOUNTER — HOSPITAL ENCOUNTER (EMERGENCY)
Facility: MEDICAL CENTER | Age: 58
End: 2023-06-13
Attending: EMERGENCY MEDICINE
Payer: COMMERCIAL

## 2023-06-13 VITALS
HEART RATE: 66 BPM | BODY MASS INDEX: 19.1 KG/M2 | RESPIRATION RATE: 18 BRPM | WEIGHT: 107.81 LBS | DIASTOLIC BLOOD PRESSURE: 69 MMHG | TEMPERATURE: 98.6 F | SYSTOLIC BLOOD PRESSURE: 94 MMHG | HEIGHT: 63 IN | OXYGEN SATURATION: 97 %

## 2023-06-13 DIAGNOSIS — R10.9 FLANK PAIN: ICD-10-CM

## 2023-06-13 LAB
ALBUMIN SERPL BCP-MCNC: 4.5 G/DL (ref 3.2–4.9)
ALBUMIN/GLOB SERPL: 1.5 G/DL
ALP SERPL-CCNC: 70 U/L (ref 30–99)
ALT SERPL-CCNC: 14 U/L (ref 2–50)
ANION GAP SERPL CALC-SCNC: 12 MMOL/L (ref 7–16)
APPEARANCE UR: CLEAR
AST SERPL-CCNC: 17 U/L (ref 12–45)
BASOPHILS # BLD AUTO: 1.4 % (ref 0–1.8)
BASOPHILS # BLD: 0.07 K/UL (ref 0–0.12)
BILIRUB SERPL-MCNC: 0.7 MG/DL (ref 0.1–1.5)
BILIRUB UR QL STRIP.AUTO: NEGATIVE
BUN SERPL-MCNC: 18 MG/DL (ref 8–22)
CALCIUM ALBUM COR SERPL-MCNC: 9.3 MG/DL (ref 8.5–10.5)
CALCIUM SERPL-MCNC: 9.7 MG/DL (ref 8.4–10.2)
CHLORIDE SERPL-SCNC: 104 MMOL/L (ref 96–112)
CO2 SERPL-SCNC: 21 MMOL/L (ref 20–33)
COLOR UR: YELLOW
CREAT SERPL-MCNC: 0.75 MG/DL (ref 0.5–1.4)
D DIMER PPP IA.FEU-MCNC: <0.27 UG/ML (FEU) (ref 0–0.5)
EKG IMPRESSION: NORMAL
EOSINOPHIL # BLD AUTO: 0.17 K/UL (ref 0–0.51)
EOSINOPHIL NFR BLD: 3.3 % (ref 0–6.9)
ERYTHROCYTE [DISTWIDTH] IN BLOOD BY AUTOMATED COUNT: 45.7 FL (ref 35.9–50)
GFR SERPLBLD CREATININE-BSD FMLA CKD-EPI: 93 ML/MIN/1.73 M 2
GLOBULIN SER CALC-MCNC: 3.1 G/DL (ref 1.9–3.5)
GLUCOSE SERPL-MCNC: 89 MG/DL (ref 65–99)
GLUCOSE UR STRIP.AUTO-MCNC: NEGATIVE MG/DL
HCT VFR BLD AUTO: 49.7 % (ref 37–47)
HGB BLD-MCNC: 16.4 G/DL (ref 12–16)
IMM GRANULOCYTES # BLD AUTO: 0.02 K/UL (ref 0–0.11)
IMM GRANULOCYTES NFR BLD AUTO: 0.4 % (ref 0–0.9)
KETONES UR STRIP.AUTO-MCNC: NEGATIVE MG/DL
LEUKOCYTE ESTERASE UR QL STRIP.AUTO: NEGATIVE
LIPASE SERPL-CCNC: 31 U/L (ref 7–58)
LYMPHOCYTES # BLD AUTO: 1.72 K/UL (ref 1–4.8)
LYMPHOCYTES NFR BLD: 33.2 % (ref 22–41)
MCH RBC QN AUTO: 30.9 PG (ref 27–33)
MCHC RBC AUTO-ENTMCNC: 33 G/DL (ref 32.2–35.5)
MCV RBC AUTO: 93.8 FL (ref 81.4–97.8)
MICRO URNS: NORMAL
MONOCYTES # BLD AUTO: 0.38 K/UL (ref 0–0.85)
MONOCYTES NFR BLD AUTO: 7.3 % (ref 0–13.4)
NEUTROPHILS # BLD AUTO: 2.82 K/UL (ref 1.82–7.42)
NEUTROPHILS NFR BLD: 54.4 % (ref 44–72)
NITRITE UR QL STRIP.AUTO: NEGATIVE
NRBC # BLD AUTO: 0 K/UL
NRBC BLD-RTO: 0 /100 WBC (ref 0–0.2)
PH UR STRIP.AUTO: 5 [PH] (ref 5–8)
PLATELET # BLD AUTO: 311 K/UL (ref 164–446)
PMV BLD AUTO: 9.9 FL (ref 9–12.9)
POTASSIUM SERPL-SCNC: 4.1 MMOL/L (ref 3.6–5.5)
PROT SERPL-MCNC: 7.6 G/DL (ref 6–8.2)
PROT UR QL STRIP: NEGATIVE MG/DL
RBC # BLD AUTO: 5.3 M/UL (ref 4.2–5.4)
RBC UR QL AUTO: NEGATIVE
SODIUM SERPL-SCNC: 137 MMOL/L (ref 135–145)
SP GR UR STRIP.AUTO: 1.02
WBC # BLD AUTO: 5.2 K/UL (ref 4.8–10.8)

## 2023-06-13 PROCEDURE — 74176 CT ABD & PELVIS W/O CONTRAST: CPT

## 2023-06-13 PROCEDURE — 36415 COLL VENOUS BLD VENIPUNCTURE: CPT

## 2023-06-13 PROCEDURE — 71045 X-RAY EXAM CHEST 1 VIEW: CPT

## 2023-06-13 PROCEDURE — 80053 COMPREHEN METABOLIC PANEL: CPT

## 2023-06-13 PROCEDURE — 93005 ELECTROCARDIOGRAM TRACING: CPT | Performed by: EMERGENCY MEDICINE

## 2023-06-13 PROCEDURE — 99284 EMERGENCY DEPT VISIT MOD MDM: CPT

## 2023-06-13 PROCEDURE — 83690 ASSAY OF LIPASE: CPT

## 2023-06-13 PROCEDURE — 81003 URINALYSIS AUTO W/O SCOPE: CPT

## 2023-06-13 PROCEDURE — 85379 FIBRIN DEGRADATION QUANT: CPT

## 2023-06-13 PROCEDURE — 85025 COMPLETE CBC W/AUTO DIFF WBC: CPT

## 2023-06-13 RX ORDER — EPINEPHRINE INHALATION 125 UG/1
1 AEROSOL RESPIRATORY (INHALATION) 2 TIMES DAILY PRN
COMMUNITY

## 2023-06-13 RX ORDER — TRAZODONE HYDROCHLORIDE 100 MG/1
50 TABLET ORAL NIGHTLY
COMMUNITY

## 2023-06-13 RX ORDER — KETOROLAC TROMETHAMINE 30 MG/ML
15 INJECTION, SOLUTION INTRAMUSCULAR; INTRAVENOUS ONCE
Status: DISCONTINUED | OUTPATIENT
Start: 2023-06-13 | End: 2023-06-13 | Stop reason: HOSPADM

## 2023-06-13 RX ORDER — MELOXICAM 15 MG/1
15 TABLET ORAL PRN
COMMUNITY

## 2023-06-13 ASSESSMENT — FIBROSIS 4 INDEX: FIB4 SCORE: 0.71

## 2023-06-13 NOTE — ED PROVIDER NOTES
ED PHYSICIAN NOTE    CHIEF COMPLAINT  Right flank pain    EXTERNAL RECORDS REVIEWED  No recent healthcare encounters.  Patient last seen primary doctor in 2021    NEREIDA/MACKENZIE Salas Wendie Shaffer is a 57 y.o. female who presents with right flank pain.  Patient reports she had a ground-level fall in February.  She hit her right ribs on some stairs.  She had pain for about a month but then the pain went away.  Unfortunately over the last 1 month the pain returned.  It is there most of the time and is constant recently.  Sharp pinching pain right flank radiating slightly to the front and side.  Pain is worse with some movements or if she takes a very deep breath.  Nothing seems to improve the pain.  She has taken a couple doses of Norco that she got from her son which kind of helped but the pain always returns.  She denies dysuria, hematuria frequency.  No nausea vomiting.  No anterior chest pain.  She has not had a fever.  No recent illness but describes allergies.  She reports a chronic problem with lightheadedness.  This seems to be getting worse lately.  When she stands up quickly or bends over and stands up she feels lightheaded.  She has not passed out.    PAST MEDICAL HISTORY  Past Medical History:   Diagnosis Date    Kidney stones     Nephrolithiasis 2011    Palpitations 1/22/2013    Staphylococcus infection in conditions classified elsewhere and of unspecified site     Syncope 1/22/2013       SOCIAL HISTORY  Social History     Tobacco Use    Smoking status: Every Day     Packs/day: 0.50     Years: 40.00     Pack years: 20.00     Types: Cigarettes     Start date: 11/5/1977    Smokeless tobacco: Never    Tobacco comments:     0.5 pack age 17yo, more prior   Vaping Use    Vaping Use: Never used   Substance Use Topics    Alcohol use: Not Currently     Comment: occ    Drug use: No       CURRENT MEDICATIONS  Home Medications    **Home medications have not yet been reviewed for this encounter**         ALLERGIES  No  "Known Allergies    PHYSICAL EXAM  VITAL SIGNS: /79   Pulse 78   Temp 36.5 °C (97.7 °F) (Temporal)   Resp 19   Ht 1.6 m (5' 3\")   Wt 48.9 kg (107 lb 12.9 oz)   LMP  (LMP Unknown)   SpO2 98%   BMI 19.10 kg/m²    Constitutional: Awake and alert  HENT: Normal inspection  Eyes: Normal inspection  Neck: Grossly normal range of motion.  Cardiovascular: Normal heart rate, Normal rhythm.  Symmetric peripheral pulses.   Thorax & Lungs: No respiratory distress, No wheezing, No rales, No rhonchi, No chest tenderness.   Abdomen: Bowel sounds normal, soft, non-distended, nontender, no mass  Skin: No obvious rash.  Back: No midline tenderness.  Right CVA tenderness  Extremities: No clubbing, cyanosis, edema, no Homans or cords.  Neurologic: Grossly normal   Psychiatric: Normal for situation     DIAGNOSTIC STUDIES / PROCEDURES  LABS/EKG  Results for orders placed or performed during the hospital encounter of 06/13/23   CBC WITH DIFFERENTIAL   Result Value Ref Range    WBC 5.2 4.8 - 10.8 K/uL    RBC 5.30 4.20 - 5.40 M/uL    Hemoglobin 16.4 (H) 12.0 - 16.0 g/dL    Hematocrit 49.7 (H) 37.0 - 47.0 %    MCV 93.8 81.4 - 97.8 fL    MCH 30.9 27.0 - 33.0 pg    MCHC 33.0 32.2 - 35.5 g/dL    RDW 45.7 35.9 - 50.0 fL    Platelet Count 311 164 - 446 K/uL    MPV 9.9 9.0 - 12.9 fL    Neutrophils-Polys 54.40 44.00 - 72.00 %    Lymphocytes 33.20 22.00 - 41.00 %    Monocytes 7.30 0.00 - 13.40 %    Eosinophils 3.30 0.00 - 6.90 %    Basophils 1.40 0.00 - 1.80 %    Immature Granulocytes 0.40 0.00 - 0.90 %    Nucleated RBC 0.00 0.00 - 0.20 /100 WBC    Neutrophils (Absolute) 2.82 1.82 - 7.42 K/uL    Lymphs (Absolute) 1.72 1.00 - 4.80 K/uL    Monos (Absolute) 0.38 0.00 - 0.85 K/uL    Eos (Absolute) 0.17 0.00 - 0.51 K/uL    Baso (Absolute) 0.07 0.00 - 0.12 K/uL    Immature Granulocytes (abs) 0.02 0.00 - 0.11 K/uL    NRBC (Absolute) 0.00 K/uL   COMP METABOLIC PANEL   Result Value Ref Range    Sodium 137 135 - 145 mmol/L    Potassium 4.1 3.6 - " 5.5 mmol/L    Chloride 104 96 - 112 mmol/L    Co2 21 20 - 33 mmol/L    Anion Gap 12.0 7.0 - 16.0    Glucose 89 65 - 99 mg/dL    Bun 18 8 - 22 mg/dL    Creatinine 0.75 0.50 - 1.40 mg/dL    Calcium 9.7 8.4 - 10.2 mg/dL    AST(SGOT) 17 12 - 45 U/L    ALT(SGPT) 14 2 - 50 U/L    Alkaline Phosphatase 70 30 - 99 U/L    Total Bilirubin 0.7 0.1 - 1.5 mg/dL    Albumin 4.5 3.2 - 4.9 g/dL    Total Protein 7.6 6.0 - 8.2 g/dL    Globulin 3.1 1.9 - 3.5 g/dL    A-G Ratio 1.5 g/dL   URINALYSIS CULTURE, IF INDICATED    Specimen: Urine, Clean Catch   Result Value Ref Range    Color Yellow     Character Clear     Specific Gravity 1.025 <1.035    Ph 5.0 5.0 - 8.0    Glucose Negative Negative mg/dL    Ketones Negative Negative mg/dL    Protein Negative Negative mg/dL    Bilirubin Negative Negative    Nitrite Negative Negative    Leukocyte Esterase Negative Negative    Occult Blood Negative Negative    Micro Urine Req see below    LIPASE   Result Value Ref Range    Lipase 31 7 - 58 U/L   D-DIMER   Result Value Ref Range    D-Dimer <0.27 0.00 - 0.50 ug/mL (FEU)   CORRECTED CALCIUM   Result Value Ref Range    Correct Calcium 9.3 8.5 - 10.5 mg/dL   ESTIMATED GFR   Result Value Ref Range    GFR (CKD-EPI) 93 >60 mL/min/1.73 m 2   EKG (Now)   Result Value Ref Range    Report       [facility]    Test Date:  2023  Pt Name:    MICHAEL JASSO                 Department: [facility]  MRN:        [id number]                      [facility]  Gender:     Female                       Technician: [id number]  :        1965                   Requested By:DAVID KEITA  Order #:    [id number]                    Reading MD: DAVID KEITA MD    Measurements  Intervals                                Axis  Rate:       61                           P:          49  OK:         173                          QRS:        81  QRSD:       92                           T:          47  QT:         400  QTc:         403    Interpretive Statements  Sinus rhythm  RSR' in V1 or V2, probably normal variant  Compared to ECG 01/21/2013 18:56:12  Sinus bradycardia no longer present  Electronically Signed On 6- 9:41:05 PDT by DAVID KEITA MD        I have independently interpreted this EKG  Rhythm strip interpretation-sinus rhythm    RADIOLOGY  I have independently interpreted the diagnostic imaging associated with this visit and am waiting the final reading from the radiologist.   My preliminary interpretation is as follows: Chest x-ray negative for pneumothorax rib fracture  Radiologist interpretation:   CT-RENAL COLIC EVALUATION(A/P W/O)   Final Result         1. No urinary tract calculus identified. No renal collecting system dilatation.      2. No evidence of inflammatory change in the abdomen or pelvis.  The study is however limited due to nonuse of intravenous contrast      DX-CHEST-PORTABLE (1 VIEW)   Final Result         1. No acute cardiopulmonary abnormalities are identified.            COURSE & MEDICAL DECISION MAKING    ED Observation Status? Yes; I am placing the patient in to an observation status due to a diagnostic uncertainty as well as therapeutic intensity. Patient placed in observation status at 810 AM, 6/13/2023.     Observation plan is as follows: Obtain diagnostic testing, treat symptoms, monitor vital signs, telemetry    Upon Reevaluation, the patient's condition has: Stable and will be discharged    Patient discharged from ED Observation status at 9:50 AM 6/13/2023     INITIAL ASSESSMENT, COURSE AND PLAN  Care Narrative: Patient presents with complaints of right flank pain.  Tender on palpation.  No deformity.  This is been rather prolonged.  She is also been feeling lightheaded.  Describes near syncope.  Mostly orthostatic, but can occur all day.  Obtained EKG without evidence of preexcitation.  Her blood pressure is on the low end but this is stable for her.  History of kidney stones.  History of  trauma.  Mild right upper abdominal tenderness.  Pain is pleuritic.  Ordered laboratory data as well as imaging.  Treat symptoms with Toradol.    Chest x-ray is negative  CT renal colic study without acute abnormalities.  Visualized lung fields normal.    CBC without leukocytosis.  She has an elevated hemoglobin not anemic.  CMP was normal.  Lipase normal.  Urinalysis without blood or other abnormality.  D-dimer is negative ruling out PE and patient low risk by Wells criteria.    At this point lightheadedness with a static by history.  I advised increase fluid consumption and increase salt in diet.  Follow-up with primary.  She has flank pain which is apparently musculoskeletal.  Advised Tylenol and/or ibuprofen as needed.  May benefit from physical therapy.  She will follow-up with her primary for this.    I precautioned patient to return to the ER for any difficulty breathing, worsening symptoms, shon syncope, abdominal pain, fevers or concern    DISPOSITION AND DISCUSSIONS    Escalation of care considered, and ultimately not performed:acute inpatient care management, however at this time, the patient is most appropriate for outpatient management    Barriers to care at this time, including but not limited to: Patient does not have established PCP.     Decision tools: Wells score low risk.  Negative D-dimer.    Prescription drugs considered and/or prescribed: Considered opiate but nonnarcotic analgesic would be most appropriate in the setting    FINAL IMPRESSION  1.  Right flank pain  2.  Orthostatic lightheadedness    This dictation was created using voice recognition software. The accuracy of the dictation is limited to the abilities of the software. I expect there may be some errors of grammar and possibly content. The nursing notes were reviewed and certain aspects of this information were incorporated into this note.    Electronically signed by: Jagdish Grey M.D., 6/13/2023

## 2023-06-13 NOTE — ED NOTES
Med rec updated and complete, per pt   Allergies reviewed, per pt  Pt reports that she took a her son prescription of NORCO half tablet yesterday @ 2000, pt is not sure the strength.